# Patient Record
Sex: MALE | Race: WHITE | Employment: OTHER | URBAN - NONMETROPOLITAN AREA
[De-identification: names, ages, dates, MRNs, and addresses within clinical notes are randomized per-mention and may not be internally consistent; named-entity substitution may affect disease eponyms.]

---

## 2017-03-01 DIAGNOSIS — I10 ESSENTIAL HYPERTENSION: ICD-10-CM

## 2017-03-01 RX ORDER — LISINOPRIL 20 MG/1
20 TABLET ORAL 2 TIMES DAILY
Qty: 60 TABLET | Refills: 0 | Status: SHIPPED | OUTPATIENT
Start: 2017-03-01 | End: 2017-03-07 | Stop reason: SDUPTHER

## 2017-03-07 ENCOUNTER — OFFICE VISIT (OUTPATIENT)
Dept: PRIMARY CARE CLINIC | Age: 62
End: 2017-03-07
Payer: COMMERCIAL

## 2017-03-07 VITALS
OXYGEN SATURATION: 95 % | BODY MASS INDEX: 40.8 KG/M2 | HEART RATE: 67 BPM | DIASTOLIC BLOOD PRESSURE: 78 MMHG | TEMPERATURE: 97.3 F | HEIGHT: 69 IN | SYSTOLIC BLOOD PRESSURE: 120 MMHG | WEIGHT: 275.5 LBS

## 2017-03-07 DIAGNOSIS — R35.1 NOCTURIA: ICD-10-CM

## 2017-03-07 DIAGNOSIS — Z13.6 ENCOUNTER FOR LIPID SCREENING FOR CARDIOVASCULAR DISEASE: ICD-10-CM

## 2017-03-07 DIAGNOSIS — Z79.899 MEDICATION MANAGEMENT: ICD-10-CM

## 2017-03-07 DIAGNOSIS — Z13.1 SCREENING FOR DIABETES MELLITUS: ICD-10-CM

## 2017-03-07 DIAGNOSIS — Z12.5 SCREENING FOR MALIGNANT NEOPLASM OF PROSTATE: ICD-10-CM

## 2017-03-07 DIAGNOSIS — Z13.220 ENCOUNTER FOR LIPID SCREENING FOR CARDIOVASCULAR DISEASE: ICD-10-CM

## 2017-03-07 DIAGNOSIS — R25.2 MUSCLE CRAMPS: ICD-10-CM

## 2017-03-07 DIAGNOSIS — L98.9 SKIN LESION: ICD-10-CM

## 2017-03-07 DIAGNOSIS — I10 ESSENTIAL HYPERTENSION: ICD-10-CM

## 2017-03-07 DIAGNOSIS — Z00.00 WELL ADULT HEALTH CHECK: Primary | ICD-10-CM

## 2017-03-07 DIAGNOSIS — E78.2 MIXED HYPERLIPIDEMIA: ICD-10-CM

## 2017-03-07 LAB
ALBUMIN SERPL-MCNC: 4.8 G/DL (ref 3.5–5.2)
ALP BLD-CCNC: 84 U/L (ref 40–130)
ALT SERPL-CCNC: 27 U/L (ref 5–41)
ANION GAP SERPL CALCULATED.3IONS-SCNC: 18 MMOL/L (ref 7–19)
AST SERPL-CCNC: 23 U/L (ref 5–40)
BILIRUB SERPL-MCNC: 0.5 MG/DL (ref 0.2–1.2)
BILIRUBIN, POC: NORMAL
BLOOD URINE, POC: NORMAL
BUN BLDV-MCNC: 14 MG/DL (ref 8–23)
CALCIUM SERPL-MCNC: 9.1 MG/DL (ref 8.8–10.2)
CHLORIDE BLD-SCNC: 99 MMOL/L (ref 98–111)
CHOLESTEROL, TOTAL: 215 MG/DL (ref 160–199)
CLARITY, POC: CLEAR
CO2: 25 MMOL/L (ref 22–29)
COLOR, POC: YELLOW
CREAT SERPL-MCNC: 0.9 MG/DL (ref 0.5–1.2)
GFR NON-AFRICAN AMERICAN: >60
GLOBULIN: 2.5 G/DL
GLUCOSE BLD-MCNC: 119 MG/DL (ref 74–109)
GLUCOSE URINE, POC: NORMAL
HCT VFR BLD CALC: 48.1 % (ref 42–52)
HDLC SERPL-MCNC: 63 MG/DL (ref 55–121)
HEMOGLOBIN: 16.2 G/DL (ref 14–18)
KETONES, POC: NORMAL
LDL CHOLESTEROL CALCULATED: 132 MG/DL
LEUKOCYTE EST, POC: NORMAL
MCH RBC QN AUTO: 30.9 PG (ref 27–31)
MCHC RBC AUTO-ENTMCNC: 33.7 G/DL (ref 33–37)
MCV RBC AUTO: 91.8 FL (ref 80–94)
NITRITE, POC: NORMAL
PDW BLD-RTO: 13.2 % (ref 11.5–14.5)
PH, POC: 5
PLATELET # BLD: 247 K/UL (ref 130–400)
PMV BLD AUTO: 10.8 FL (ref 7.4–10.4)
POTASSIUM SERPL-SCNC: 3.7 MMOL/L (ref 3.5–5)
PROSTATE SPECIFIC ANTIGEN: 2.35 NG/ML (ref 0–4)
PROTEIN, POC: NORMAL
RBC # BLD: 5.24 M/UL (ref 4.7–6.1)
SODIUM BLD-SCNC: 142 MMOL/L (ref 136–145)
SPECIFIC GRAVITY, POC: 1.02
TOTAL CK: 166 U/L (ref 39–308)
TOTAL PROTEIN: 7.3 G/DL (ref 6.6–8.7)
TRIGL SERPL-MCNC: 98 MG/DL (ref 150–199)
UROBILINOGEN, POC: 0.2
WBC # BLD: 5.6 K/UL (ref 4.8–10.8)

## 2017-03-07 PROCEDURE — 99396 PREV VISIT EST AGE 40-64: CPT | Performed by: NURSE PRACTITIONER

## 2017-03-07 PROCEDURE — 81002 URINALYSIS NONAUTO W/O SCOPE: CPT | Performed by: NURSE PRACTITIONER

## 2017-03-07 PROCEDURE — 36415 COLL VENOUS BLD VENIPUNCTURE: CPT | Performed by: NURSE PRACTITIONER

## 2017-03-07 RX ORDER — AMLODIPINE BESYLATE AND BENAZEPRIL HYDROCHLORIDE 5; 20 MG/1; MG/1
1 CAPSULE ORAL DAILY
Qty: 90 CAPSULE | Refills: 3 | Status: SHIPPED | OUTPATIENT
Start: 2017-03-07 | End: 2017-03-15 | Stop reason: SDUPTHER

## 2017-03-07 RX ORDER — AMLODIPINE BESYLATE AND BENAZEPRIL HYDROCHLORIDE 5; 20 MG/1; MG/1
1 CAPSULE ORAL DAILY
COMMUNITY
End: 2017-03-07 | Stop reason: SDUPTHER

## 2017-03-07 RX ORDER — DULOXETIN HYDROCHLORIDE 30 MG/1
30 CAPSULE, DELAYED RELEASE ORAL DAILY
Qty: 30 CAPSULE | Refills: 0 | Status: SHIPPED | OUTPATIENT
Start: 2017-03-07 | End: 2018-03-08 | Stop reason: ALTCHOICE

## 2017-03-07 RX ORDER — LISINOPRIL 20 MG/1
20 TABLET ORAL 2 TIMES DAILY
Qty: 180 TABLET | Refills: 3 | Status: SHIPPED | OUTPATIENT
Start: 2017-03-07 | End: 2017-03-07 | Stop reason: SDUPTHER

## 2017-03-07 RX ORDER — LISINOPRIL 20 MG/1
20 TABLET ORAL 2 TIMES DAILY
Qty: 180 TABLET | Refills: 3 | Status: SHIPPED | OUTPATIENT
Start: 2017-03-07 | End: 2017-03-15 | Stop reason: SDUPTHER

## 2017-03-07 RX ORDER — AMLODIPINE BESYLATE AND BENAZEPRIL HYDROCHLORIDE 5; 20 MG/1; MG/1
1 CAPSULE ORAL DAILY
Qty: 90 CAPSULE | Refills: 3 | Status: SHIPPED | OUTPATIENT
Start: 2017-03-07 | End: 2017-03-07 | Stop reason: SDUPTHER

## 2017-03-09 ENCOUNTER — TELEPHONE (OUTPATIENT)
Dept: PRIMARY CARE CLINIC | Age: 62
End: 2017-03-09

## 2017-03-15 DIAGNOSIS — I10 ESSENTIAL HYPERTENSION: ICD-10-CM

## 2017-03-15 RX ORDER — AMLODIPINE BESYLATE AND BENAZEPRIL HYDROCHLORIDE 5; 20 MG/1; MG/1
1 CAPSULE ORAL DAILY
Qty: 90 CAPSULE | Refills: 3 | Status: SHIPPED | OUTPATIENT
Start: 2017-03-15 | End: 2018-03-08 | Stop reason: SDUPTHER

## 2017-03-15 RX ORDER — LISINOPRIL 20 MG/1
20 TABLET ORAL 2 TIMES DAILY
Qty: 180 TABLET | Refills: 3 | Status: SHIPPED | OUTPATIENT
Start: 2017-03-15 | End: 2018-03-08 | Stop reason: SDUPTHER

## 2017-04-27 RX ORDER — PRAVASTATIN SODIUM 40 MG
TABLET ORAL
Qty: 180 TABLET | Refills: 2 | Status: SHIPPED | OUTPATIENT
Start: 2017-04-27 | End: 2018-03-08 | Stop reason: SDUPTHER

## 2017-11-29 ENCOUNTER — OFFICE VISIT (OUTPATIENT)
Dept: PRIMARY CARE CLINIC | Age: 62
End: 2017-11-29
Payer: COMMERCIAL

## 2017-11-29 VITALS
DIASTOLIC BLOOD PRESSURE: 88 MMHG | SYSTOLIC BLOOD PRESSURE: 138 MMHG | BODY MASS INDEX: 39.25 KG/M2 | HEART RATE: 68 BPM | HEIGHT: 69 IN | OXYGEN SATURATION: 96 % | WEIGHT: 265 LBS | RESPIRATION RATE: 20 BRPM | TEMPERATURE: 97.3 F

## 2017-11-29 DIAGNOSIS — N20.0 KIDNEY STONE: Primary | ICD-10-CM

## 2017-11-29 DIAGNOSIS — J06.9 URI, ACUTE: ICD-10-CM

## 2017-11-29 PROCEDURE — 99214 OFFICE O/P EST MOD 30 MIN: CPT | Performed by: FAMILY MEDICINE

## 2017-11-29 RX ORDER — GUAIFENESIN AND CODEINE PHOSPHATE 100; 10 MG/5ML; MG/5ML
5 SOLUTION ORAL 2 TIMES DAILY PRN
Qty: 140 ML | Refills: 0 | Status: SHIPPED | OUTPATIENT
Start: 2017-11-29 | End: 2017-12-04

## 2017-11-29 RX ORDER — PREDNISONE 10 MG/1
TABLET ORAL
Qty: 21 EACH | Refills: 0 | Status: SHIPPED | OUTPATIENT
Start: 2017-11-29 | End: 2017-12-04

## 2017-11-29 ASSESSMENT — ENCOUNTER SYMPTOMS
ABDOMINAL PAIN: 1
WHEEZING: 1
COLOR CHANGE: 0
RHINORRHEA: 0
COUGH: 1
NAUSEA: 1
DIARRHEA: 0
VOMITING: 1
CONSTIPATION: 0

## 2017-11-29 NOTE — PROGRESS NOTES
Subjective:      Patient ID: Bernie Flynn is a 57 y.o. male. HPI   Patient presents today for problems with cough and congestion. He states that he started feeling bad last Friday and was seen in the ER due to severe left-sided flank pain. He states that he had nausea and vomiting associated with the flank pain. He states that he was seen in the ER they diagnosed him with a 2 mm ureteral stone. He states that they put him on medications to help with the pain but they really did not help much. He states that he felt the stone removed a couple days ago and the pain has started improving. Of coincidence, he has started having cough, congestion, headache, and wheezing. He states that he has not been taking anything other than over-the-counter Mucinex for the symptoms. He states that he wanted to make sure that these were not related. He states that he is having difficulty breathing and having some wheezing when he lays down at night. Past Medical History:   Diagnosis Date    Chronic back pain     Hyperlipidemia     Hypertension     Obesity      Current Outpatient Prescriptions on File Prior to Visit   Medication Sig Dispense Refill    pravastatin (PRAVACHOL) 40 MG tablet TAKE 2 TABLETS NIGHTLY 180 tablet 2    lisinopril (PRINIVIL;ZESTRIL) 20 MG tablet Take 1 tablet by mouth 2 times daily For blood pressure 180 tablet 3    amLODIPine-benazepril (LOTREL) 5-20 MG per capsule Take 1 capsule by mouth daily 90 capsule 3    cloNIDine (CATAPRES) 0.1 MG tablet Take 1 tablet by mouth daily as needed 90 tablet 3    DULoxetine (CYMBALTA) 30 MG extended release capsule Take 1 capsule by mouth daily May go down to 30mg and come off if desired. 30 capsule 0     No current facility-administered medications on file prior to visit. No Known Allergies    Review of Systems   Constitutional: Positive for chills, fatigue and fever. Negative for activity change and appetite change.    HENT: Positive for congestion. Negative for rhinorrhea. Eyes: Negative for visual disturbance. Respiratory: Positive for cough and wheezing. Cardiovascular: Negative for chest pain and palpitations. Gastrointestinal: Positive for abdominal pain (after throwing up from kidney stone), nausea and vomiting. Negative for constipation and diarrhea. Genitourinary: Negative for decreased urine volume and difficulty urinating. Musculoskeletal: Negative for arthralgias. Skin: Negative for color change and rash. Allergic/Immunologic: Negative for immunocompromised state. Neurological: Positive for headaches. Negative for seizures. Hematological: Does not bruise/bleed easily. Psychiatric/Behavioral: Negative for agitation and sleep disturbance. Objective:   Physical Exam   Constitutional: He is oriented to person, place, and time. He appears well-developed and well-nourished. No distress. HENT:   Head: Normocephalic and atraumatic. Right Ear: Tympanic membrane normal.   Left Ear: Tympanic membrane normal.   Nose: Mucosal edema and rhinorrhea present. Right sinus exhibits no maxillary sinus tenderness and no frontal sinus tenderness. Left sinus exhibits no maxillary sinus tenderness and no frontal sinus tenderness. Mouth/Throat: Uvula is midline, oropharynx is clear and moist and mucous membranes are normal.   Neck: Normal range of motion. Neck supple. Cardiovascular: Normal rate, regular rhythm, normal heart sounds and intact distal pulses. Pulmonary/Chest: Effort normal and breath sounds normal. No respiratory distress. He has no wheezes. Abdominal: Soft. Bowel sounds are normal. There is no tenderness. Lymphadenopathy:     He has no cervical adenopathy. Neurological: He is alert and oriented to person, place, and time. Skin: Skin is warm and dry. No rash noted. He is not diaphoretic. Psychiatric: He has a normal mood and affect.  His behavior is normal. Judgment and thought content normal.

## 2017-12-04 ENCOUNTER — HOSPITAL ENCOUNTER (OUTPATIENT)
Dept: GENERAL RADIOLOGY | Age: 62
Discharge: HOME | End: 2017-12-04
Payer: COMMERCIAL

## 2017-12-04 ENCOUNTER — OFFICE VISIT (OUTPATIENT)
Dept: PRIMARY CARE CLINIC | Age: 62
End: 2017-12-04
Payer: COMMERCIAL

## 2017-12-04 VITALS
HEART RATE: 62 BPM | RESPIRATION RATE: 16 BRPM | BODY MASS INDEX: 40.88 KG/M2 | OXYGEN SATURATION: 98 % | WEIGHT: 276.8 LBS | SYSTOLIC BLOOD PRESSURE: 132 MMHG | DIASTOLIC BLOOD PRESSURE: 86 MMHG | TEMPERATURE: 98.6 F

## 2017-12-04 DIAGNOSIS — R05.9 COUGH: ICD-10-CM

## 2017-12-04 DIAGNOSIS — R05.9 COUGH: Primary | ICD-10-CM

## 2017-12-04 DIAGNOSIS — J40 BRONCHITIS: ICD-10-CM

## 2017-12-04 PROCEDURE — 99213 OFFICE O/P EST LOW 20 MIN: CPT | Performed by: FAMILY MEDICINE

## 2017-12-04 PROCEDURE — 71020 XR CHEST STANDARD TWO VW: CPT

## 2017-12-04 RX ORDER — AZITHROMYCIN 250 MG/1
TABLET, FILM COATED ORAL
Qty: 1 PACKET | Refills: 0 | Status: SHIPPED | OUTPATIENT
Start: 2017-12-04 | End: 2017-12-14

## 2017-12-04 RX ORDER — GUAIFENESIN 600 MG/1
600 TABLET, EXTENDED RELEASE ORAL 2 TIMES DAILY
Qty: 20 TABLET | Refills: 0 | Status: SHIPPED | OUTPATIENT
Start: 2017-12-04 | End: 2018-09-10

## 2017-12-04 RX ORDER — ALBUTEROL SULFATE 90 UG/1
2 AEROSOL, METERED RESPIRATORY (INHALATION) EVERY 6 HOURS PRN
Qty: 1 INHALER | Refills: 3 | Status: SHIPPED | OUTPATIENT
Start: 2017-12-04 | End: 2018-09-10

## 2017-12-04 RX ORDER — PREDNISONE 10 MG/1
TABLET ORAL
Qty: 1 EACH | Refills: 0 | Status: SHIPPED | OUTPATIENT
Start: 2017-12-04 | End: 2018-03-08 | Stop reason: ALTCHOICE

## 2017-12-06 ASSESSMENT — ENCOUNTER SYMPTOMS
SINUS PRESSURE: 1
CONSTIPATION: 0
COLOR CHANGE: 0
ABDOMINAL PAIN: 0
NAUSEA: 0
VOMITING: 0
COUGH: 1
RHINORRHEA: 1
DIARRHEA: 0

## 2017-12-07 NOTE — PROGRESS NOTES
Allergic/Immunologic: Negative for immunocompromised state. Neurological: Negative for seizures and headaches. Hematological: Does not bruise/bleed easily. Psychiatric/Behavioral: Negative for agitation and sleep disturbance. The patient is not nervous/anxious. Objective:   Physical Exam   Constitutional: He is oriented to person, place, and time. He appears well-developed and well-nourished. No distress. HENT:   Head: Normocephalic and atraumatic. Right Ear: Tympanic membrane normal.   Left Ear: Tympanic membrane normal.   Nose: Mucosal edema and rhinorrhea present. Right sinus exhibits no maxillary sinus tenderness and no frontal sinus tenderness. Left sinus exhibits no maxillary sinus tenderness and no frontal sinus tenderness. Mouth/Throat: Uvula is midline, oropharynx is clear and moist and mucous membranes are normal.   Neck: Normal range of motion. Neck supple. Cardiovascular: Normal rate, regular rhythm, normal heart sounds and intact distal pulses. Pulmonary/Chest: Effort normal. No respiratory distress. He has wheezes. Lymphadenopathy:     He has no cervical adenopathy. Neurological: He is alert and oriented to person, place, and time. Skin: Skin is warm and dry. No rash noted. He is not diaphoretic. Psychiatric: He has a normal mood and affect. His behavior is normal. Judgment and thought content normal.     /86 (Site: Right Arm, Position: Sitting, Cuff Size: Medium Adult)   Pulse 62   Temp 98.6 °F (37 °C) (Temporal)   Resp 16   Wt 276 lb 12.8 oz (125.6 kg)   SpO2 98%   BMI 40.88 kg/m²     Assessment:        ICD-10-CM ICD-9-CM    1. Cough R05 786.2 XR CHEST STANDARD (2 VW)   2. Bronchitis J40 490             Plan:      See prescription orders. Apgar ahead and send him some Zithromax and prednisone today. I have also given him an inhaler for the chest tightness and wheezing. We will plan to get chest x-ray notify results.

## 2018-03-08 ENCOUNTER — OFFICE VISIT (OUTPATIENT)
Dept: PRIMARY CARE CLINIC | Age: 63
End: 2018-03-08
Payer: MEDICARE

## 2018-03-08 VITALS
SYSTOLIC BLOOD PRESSURE: 140 MMHG | BODY MASS INDEX: 42.06 KG/M2 | HEIGHT: 69 IN | OXYGEN SATURATION: 97 % | HEART RATE: 75 BPM | DIASTOLIC BLOOD PRESSURE: 90 MMHG | TEMPERATURE: 98.3 F | WEIGHT: 284 LBS

## 2018-03-08 DIAGNOSIS — E78.2 MIXED HYPERLIPIDEMIA: ICD-10-CM

## 2018-03-08 DIAGNOSIS — Z79.899 MEDICATION MANAGEMENT: ICD-10-CM

## 2018-03-08 DIAGNOSIS — I10 ESSENTIAL HYPERTENSION: ICD-10-CM

## 2018-03-08 DIAGNOSIS — Z23 NEED FOR PNEUMOCOCCAL VACCINATION: ICD-10-CM

## 2018-03-08 DIAGNOSIS — Z13.1 SCREENING FOR DIABETES MELLITUS: ICD-10-CM

## 2018-03-08 DIAGNOSIS — Z00.00 ROUTINE GENERAL MEDICAL EXAMINATION AT A HEALTH CARE FACILITY: Primary | ICD-10-CM

## 2018-03-08 DIAGNOSIS — R73.9 ELEVATED BLOOD SUGAR: ICD-10-CM

## 2018-03-08 DIAGNOSIS — Z13.220 ENCOUNTER FOR LIPID SCREENING FOR CARDIOVASCULAR DISEASE: ICD-10-CM

## 2018-03-08 DIAGNOSIS — Z12.5 SCREENING FOR MALIGNANT NEOPLASM OF PROSTATE: ICD-10-CM

## 2018-03-08 DIAGNOSIS — Z13.6 ENCOUNTER FOR LIPID SCREENING FOR CARDIOVASCULAR DISEASE: ICD-10-CM

## 2018-03-08 LAB
ALBUMIN SERPL-MCNC: 4.2 G/DL (ref 3.5–5.2)
ALP BLD-CCNC: 68 U/L (ref 40–130)
ALT SERPL-CCNC: 21 U/L (ref 5–41)
ANION GAP SERPL CALCULATED.3IONS-SCNC: 16 MMOL/L (ref 7–19)
AST SERPL-CCNC: 17 U/L (ref 5–40)
BILIRUB SERPL-MCNC: 0.7 MG/DL (ref 0.2–1.2)
BUN BLDV-MCNC: 14 MG/DL (ref 8–23)
CALCIUM SERPL-MCNC: 8.9 MG/DL (ref 8.8–10.2)
CHLORIDE BLD-SCNC: 101 MMOL/L (ref 98–111)
CHOLESTEROL, TOTAL: 177 MG/DL (ref 160–199)
CO2: 24 MMOL/L (ref 22–29)
CREAT SERPL-MCNC: 0.9 MG/DL (ref 0.5–1.2)
GFR NON-AFRICAN AMERICAN: >60
GLUCOSE BLD-MCNC: 132 MG/DL (ref 74–109)
HBA1C MFR BLD: 6.4 %
HCT VFR BLD CALC: 43.6 % (ref 42–52)
HDLC SERPL-MCNC: 51 MG/DL (ref 55–121)
HEMOGLOBIN: 15.2 G/DL (ref 14–18)
LDL CHOLESTEROL CALCULATED: 107 MG/DL
MCH RBC QN AUTO: 30.7 PG (ref 27–31)
MCHC RBC AUTO-ENTMCNC: 34.9 G/DL (ref 33–37)
MCV RBC AUTO: 88.1 FL (ref 80–94)
PDW BLD-RTO: 13.2 % (ref 11.5–14.5)
PLATELET # BLD: 209 K/UL (ref 130–400)
PMV BLD AUTO: 11.3 FL (ref 9.4–12.4)
POTASSIUM SERPL-SCNC: 3.8 MMOL/L (ref 3.5–5)
PROSTATE SPECIFIC ANTIGEN: 1.87 NG/ML (ref 0–4)
RBC # BLD: 4.95 M/UL (ref 4.7–6.1)
SODIUM BLD-SCNC: 141 MMOL/L (ref 136–145)
TOTAL PROTEIN: 6.7 G/DL (ref 6.6–8.7)
TRIGL SERPL-MCNC: 95 MG/DL (ref 0–149)
WBC # BLD: 5.5 K/UL (ref 4.8–10.8)

## 2018-03-08 PROCEDURE — G0009 ADMIN PNEUMOCOCCAL VACCINE: HCPCS | Performed by: NURSE PRACTITIONER

## 2018-03-08 PROCEDURE — G0402 INITIAL PREVENTIVE EXAM: HCPCS | Performed by: NURSE PRACTITIONER

## 2018-03-08 PROCEDURE — 90670 PCV13 VACCINE IM: CPT | Performed by: NURSE PRACTITIONER

## 2018-03-08 PROCEDURE — 36415 COLL VENOUS BLD VENIPUNCTURE: CPT | Performed by: NURSE PRACTITIONER

## 2018-03-08 RX ORDER — AMLODIPINE BESYLATE AND BENAZEPRIL HYDROCHLORIDE 5; 20 MG/1; MG/1
1 CAPSULE ORAL DAILY
Qty: 90 CAPSULE | Refills: 3 | Status: SHIPPED | OUTPATIENT
Start: 2018-03-08 | End: 2018-03-28 | Stop reason: SDUPTHER

## 2018-03-08 RX ORDER — PRAVASTATIN SODIUM 40 MG
TABLET ORAL
Qty: 180 TABLET | Refills: 2 | Status: SHIPPED | OUTPATIENT
Start: 2018-03-08 | End: 2018-03-28 | Stop reason: SDUPTHER

## 2018-03-08 RX ORDER — LISINOPRIL 20 MG/1
20 TABLET ORAL 2 TIMES DAILY
Qty: 180 TABLET | Refills: 3 | Status: SHIPPED | OUTPATIENT
Start: 2018-03-08 | End: 2018-03-28 | Stop reason: SDUPTHER

## 2018-03-08 ASSESSMENT — ANXIETY QUESTIONNAIRES: GAD7 TOTAL SCORE: 3

## 2018-03-08 ASSESSMENT — PATIENT HEALTH QUESTIONNAIRE - PHQ9
SUM OF ALL RESPONSES TO PHQ9 QUESTIONS 1 & 2: 0
2. FEELING DOWN, DEPRESSED OR HOPELESS: 0
SUM OF ALL RESPONSES TO PHQ QUESTIONS 1-9: 0
1. LITTLE INTEREST OR PLEASURE IN DOING THINGS: 0

## 2018-03-08 ASSESSMENT — LIFESTYLE VARIABLES: HOW OFTEN DO YOU HAVE A DRINK CONTAINING ALCOHOL: 0

## 2018-03-28 DIAGNOSIS — I10 ESSENTIAL HYPERTENSION: ICD-10-CM

## 2018-03-28 RX ORDER — PRAVASTATIN SODIUM 40 MG
TABLET ORAL
Qty: 180 TABLET | Refills: 2 | Status: SHIPPED | OUTPATIENT
Start: 2018-03-28 | End: 2019-05-20

## 2018-03-28 RX ORDER — AMLODIPINE BESYLATE AND BENAZEPRIL HYDROCHLORIDE 5; 20 MG/1; MG/1
1 CAPSULE ORAL DAILY
Qty: 90 CAPSULE | Refills: 3 | Status: SHIPPED | OUTPATIENT
Start: 2018-03-28 | End: 2019-03-15

## 2018-03-28 RX ORDER — LISINOPRIL 20 MG/1
20 TABLET ORAL 2 TIMES DAILY
Qty: 180 TABLET | Refills: 3 | Status: SHIPPED | OUTPATIENT
Start: 2018-03-28 | End: 2019-03-12 | Stop reason: ALTCHOICE

## 2018-09-10 ENCOUNTER — OFFICE VISIT (OUTPATIENT)
Dept: PRIMARY CARE CLINIC | Age: 63
End: 2018-09-10
Payer: MEDICARE

## 2018-09-10 VITALS
SYSTOLIC BLOOD PRESSURE: 142 MMHG | TEMPERATURE: 97.1 F | BODY MASS INDEX: 42.86 KG/M2 | DIASTOLIC BLOOD PRESSURE: 88 MMHG | WEIGHT: 289.4 LBS | RESPIRATION RATE: 16 BRPM | OXYGEN SATURATION: 96 % | HEIGHT: 69 IN | HEART RATE: 71 BPM

## 2018-09-10 DIAGNOSIS — M54.41 ACUTE RIGHT-SIDED LOW BACK PAIN WITH RIGHT-SIDED SCIATICA: ICD-10-CM

## 2018-09-10 DIAGNOSIS — E66.01 MORBID OBESITY WITH BMI OF 40.0-44.9, ADULT (HCC): ICD-10-CM

## 2018-09-10 DIAGNOSIS — I10 ESSENTIAL HYPERTENSION: Primary | ICD-10-CM

## 2018-09-10 DIAGNOSIS — R73.9 ELEVATED BLOOD SUGAR: ICD-10-CM

## 2018-09-10 DIAGNOSIS — E78.2 MIXED HYPERLIPIDEMIA: ICD-10-CM

## 2018-09-10 LAB
ALBUMIN SERPL-MCNC: 4.5 G/DL (ref 3.5–5.2)
ALP BLD-CCNC: 82 U/L (ref 40–130)
ALT SERPL-CCNC: 28 U/L (ref 5–41)
ANION GAP SERPL CALCULATED.3IONS-SCNC: 18 MMOL/L (ref 7–19)
AST SERPL-CCNC: 24 U/L (ref 5–40)
BILIRUB SERPL-MCNC: 0.6 MG/DL (ref 0.2–1.2)
BUN BLDV-MCNC: 14 MG/DL (ref 8–23)
CALCIUM SERPL-MCNC: 9.3 MG/DL (ref 8.8–10.2)
CHLORIDE BLD-SCNC: 100 MMOL/L (ref 98–111)
CHOLESTEROL, TOTAL: 199 MG/DL (ref 160–199)
CO2: 23 MMOL/L (ref 22–29)
CREAT SERPL-MCNC: 0.9 MG/DL (ref 0.5–1.2)
GFR NON-AFRICAN AMERICAN: >60
GLUCOSE BLD-MCNC: 148 MG/DL (ref 74–109)
HBA1C MFR BLD: 6.7 % (ref 4–6)
HDLC SERPL-MCNC: 51 MG/DL (ref 55–121)
LDL CHOLESTEROL CALCULATED: 128 MG/DL
POTASSIUM SERPL-SCNC: 3.5 MMOL/L (ref 3.5–5)
SODIUM BLD-SCNC: 141 MMOL/L (ref 136–145)
TOTAL PROTEIN: 7.1 G/DL (ref 6.6–8.7)
TRIGL SERPL-MCNC: 99 MG/DL (ref 0–149)

## 2018-09-10 PROCEDURE — 1123F ACP DISCUSS/DSCN MKR DOCD: CPT | Performed by: NURSE PRACTITIONER

## 2018-09-10 PROCEDURE — G8427 DOCREV CUR MEDS BY ELIG CLIN: HCPCS | Performed by: NURSE PRACTITIONER

## 2018-09-10 PROCEDURE — 36415 COLL VENOUS BLD VENIPUNCTURE: CPT | Performed by: NURSE PRACTITIONER

## 2018-09-10 PROCEDURE — 1036F TOBACCO NON-USER: CPT | Performed by: NURSE PRACTITIONER

## 2018-09-10 PROCEDURE — 3017F COLORECTAL CA SCREEN DOC REV: CPT | Performed by: NURSE PRACTITIONER

## 2018-09-10 PROCEDURE — 1101F PT FALLS ASSESS-DOCD LE1/YR: CPT | Performed by: NURSE PRACTITIONER

## 2018-09-10 PROCEDURE — 99214 OFFICE O/P EST MOD 30 MIN: CPT | Performed by: NURSE PRACTITIONER

## 2018-09-10 PROCEDURE — G8417 CALC BMI ABV UP PARAM F/U: HCPCS | Performed by: NURSE PRACTITIONER

## 2018-09-10 PROCEDURE — 4040F PNEUMOC VAC/ADMIN/RCVD: CPT | Performed by: NURSE PRACTITIONER

## 2018-09-10 RX ORDER — OXYCODONE HYDROCHLORIDE AND ACETAMINOPHEN 5; 325 MG/1; MG/1
1 TABLET ORAL EVERY 6 HOURS PRN
Qty: 12 TABLET | Refills: 0 | Status: SHIPPED | OUTPATIENT
Start: 2018-09-10 | End: 2018-09-13

## 2018-09-10 ASSESSMENT — ENCOUNTER SYMPTOMS
BACK PAIN: 1
GASTROINTESTINAL NEGATIVE: 1

## 2018-09-10 NOTE — PROGRESS NOTES
[facility] PHYSICIAN SERVICES  [facility] [place]  [address]  [address]  Dept: [phone]  Dept Fax: [phone]  Loc: [phone]    Sandra Recio is a 70 y.o. male who presents today for his medical conditions/complaints as noted below. Sandra Recio is c/o of Back Pain (hurt it about 3 weeks ago, did have trouble sitting, has gotten better now its more of discomfort, no radiation down his legs); Hypertension; and 6 Month Follow-Up        HPI:     HPI   Chief Complaint   Patient presents with    Back Pain     hurt it about 3 weeks ago, did have trouble sitting, has gotten better now its more of discomfort, no radiation down his legs    Hypertension    6 Month Follow-Up   He has had several eye surgeries this year and has gained weight. His bp has been varying. He denies any specific injury to is low back. He has had flare ups of low back pain in the past.    Past Medical History:   Diagnosis Date    Chronic back pain     Hyperlipidemia     Hypertension     Obesity       Past Surgical History:   Procedure Laterality Date    ACHILLES TENDON SURGERY      COLONOSCOPY  12/16/15    [facility], normal    EYE SURGERY Bilateral     lens replaced    PRE-MALIGNANT / BENIGN SKIN LESION EXCISION  2017    RETINAL DETACHMENT SURGERY Left 10/14    RETINAL DETACHMENT SURGERY Left 07/2018    VASCULAR SURGERY  11/4/15 [facility] [address]    Aortorenal arteriogram with oblique views of the renal arteries.        Vitals 9/10/2018 9/10/2018 3/8/2018 3/8/2018 12/4/2017 86/43/0039   SYSTOLIC [phone] [id number]   DIASTOLIC 88 96 90 94 86 88   Site - Left Upper Arm - Right Arm Right Arm Right Arm   Position - Sitting - Sitting Sitting Sitting   Cuff Size - Medium Adult - Medium Adult Medium Adult Large Adult   Pulse - 71 - 75 62 68   Temp - 97.1 - 98.3 98.6 97.3   Resp - 16 - - 16 20   Weight - 289 lb 6.4 oz - 284 lb 276 lb 12.8 oz 265 lb   Height - 5' 9\" - 5' 9\" - 5' 9\"   BMI (wt*703/ht~2) - 42.73 kg/m2 - 41.93 kg/m2 - 39.13 kg/m2   Some recent data might be hidden       Family History   Problem Relation Age of Onset    Heart Disease Mother     High Blood Pressure Mother     Heart Disease Father     High Blood Pressure Father        Social History   Substance Use Topics    Smoking status: Never Smoker    Smokeless tobacco: Never Used    Alcohol use No      Current Outpatient Prescriptions   Medication Sig Dispense Refill    oxyCODONE-acetaminophen (PERCOCET) 5-325 MG per tablet Take 1 tablet by mouth every 6 hours as needed for Pain for up to 3 days. Intended supply: 3 days. Take lowest dose possible to manage pain. 12 tablet 0    amLODIPine-benazepril (LOTREL) 5-20 MG per capsule Take 1 capsule by mouth daily 90 capsule 3    lisinopril (PRINIVIL;ZESTRIL) 20 MG tablet Take 1 tablet by mouth 2 times daily For blood pressure 180 tablet 3    pravastatin (PRAVACHOL) 40 MG tablet TAKE 2 TABLETS NIGHTLY 180 tablet 2    cloNIDine (CATAPRES) 0.1 MG tablet Take 1 tablet by mouth daily as needed 90 tablet 3     No current facility-administered medications for this visit. No Known Allergies    Health Maintenance   Topic Date Due    Shingles Vaccine (1 of 2 - 2 Dose Series) 01/25/2003    Flu vaccine (1) 09/01/2018    DTaP/Tdap/Td vaccine (1 - Tdap) 04/16/2019 (Originally 6/28/2011)    Hepatitis C screen  04/24/2021 (Originally 1955)    HIV screen  04/17/2020 (Originally 1/25/1970)    A1C test (Diabetic or Prediabetic)  03/08/2019    Pneumococcal low/med risk (2 of 2 - PPSV23) 03/08/2019    Potassium monitoring  03/08/2019    Creatinine monitoring  03/08/2019    Colon cancer screen colonoscopy  12/16/2020    Lipid screen  03/08/2023       Subjective:      Review of Systems   Constitutional: Negative. HENT: Negative. Eyes:        Recent eye surgery   Gastrointestinal: Negative. Musculoskeletal: Positive for back pain. Psychiatric/Behavioral: Negative. Objective:     Physical Exam   Constitutional: He is oriented to person, place, and time. He appears well-developed and well-nourished. HENT:   Head: Normocephalic. Cardiovascular: Normal rate, regular rhythm and normal heart sounds. Pulmonary/Chest: Effort normal and breath sounds normal.   Abdominal: Soft. Bowel sounds are normal.   Musculoskeletal:        Lumbar back: He exhibits decreased range of motion, tenderness and pain. He exhibits no bony tenderness, no swelling, no edema, no deformity, no laceration, no spasm and normal pulse. Neurological: He is alert and oriented to person, place, and time. Skin: Skin is warm and dry. Psychiatric: He has a normal mood and affect. His behavior is normal. Judgment and thought content normal.   Nursing note and vitals reviewed. BP (!) 142/88   Pulse 71   Temp 97.1 °F (36.2 °C) (Temporal)   Resp 16   Ht 5' 9\" (1.753 m)   Wt 289 lb 6.4 oz (131.3 kg)   SpO2 96%   BMI 42.74 kg/m²     Assessment:       Diagnosis Orders   1. Essential hypertension  Comprehensive Metabolic Panel   2. Mixed hyperlipidemia  Lipid Panel   3. Class 3 obesity without serious comorbidity with body mass index (BMI) of 40.0 to 44.9 in adult, unspecified obesity type (HCC)     4. Elevated blood sugar  Hemoglobin A1c   5. Acute right-sided low back pain with right-sided sciatica  oxyCODONE-acetaminophen (PERCOCET) 5-325 MG per tablet       Plan:   He had a kidney stone in the spring but this back pain is not that bad. He would like to have a few percocet to have on hand for pain in his back or kidney stone pain. More than 50% of the time was spent counseling and coordinating care for a total time of 60min face to face. Patient given educational materials - see patient instructions. Discussed use, benefit, and side effects of prescribed medications. All patient questions answered. Pt voiced understanding. Reviewed health maintenance.   Instructed to continue current

## 2019-03-12 ENCOUNTER — OFFICE VISIT (OUTPATIENT)
Dept: PRIMARY CARE CLINIC | Age: 64
End: 2019-03-12
Payer: MEDICARE

## 2019-03-12 VITALS
OXYGEN SATURATION: 96 % | DIASTOLIC BLOOD PRESSURE: 90 MMHG | SYSTOLIC BLOOD PRESSURE: 140 MMHG | TEMPERATURE: 97.4 F | HEIGHT: 69 IN | BODY MASS INDEX: 42.36 KG/M2 | HEART RATE: 68 BPM | WEIGHT: 286 LBS

## 2019-03-12 DIAGNOSIS — E66.01 MORBID OBESITY WITH BMI OF 40.0-44.9, ADULT (HCC): ICD-10-CM

## 2019-03-12 DIAGNOSIS — Z23 NEED FOR PNEUMOCOCCAL VACCINATION: ICD-10-CM

## 2019-03-12 DIAGNOSIS — Z13.1 SCREENING FOR DIABETES MELLITUS: ICD-10-CM

## 2019-03-12 DIAGNOSIS — Z79.899 MEDICATION MANAGEMENT: ICD-10-CM

## 2019-03-12 DIAGNOSIS — E11.9 TYPE 2 DIABETES MELLITUS WITHOUT COMPLICATION, WITHOUT LONG-TERM CURRENT USE OF INSULIN (HCC): ICD-10-CM

## 2019-03-12 DIAGNOSIS — Z00.00 ROUTINE GENERAL MEDICAL EXAMINATION AT A HEALTH CARE FACILITY: Primary | ICD-10-CM

## 2019-03-12 DIAGNOSIS — Z13.220 ENCOUNTER FOR SCREENING FOR LIPID DISORDER: ICD-10-CM

## 2019-03-12 DIAGNOSIS — Z12.5 SCREENING FOR MALIGNANT NEOPLASM OF PROSTATE: ICD-10-CM

## 2019-03-12 DIAGNOSIS — R73.9 ELEVATED BLOOD SUGAR: ICD-10-CM

## 2019-03-12 DIAGNOSIS — I10 ESSENTIAL HYPERTENSION: ICD-10-CM

## 2019-03-12 LAB
ALBUMIN SERPL-MCNC: 4.4 G/DL (ref 3.5–5.2)
ALP BLD-CCNC: 83 U/L (ref 40–130)
ALT SERPL-CCNC: 25 U/L (ref 5–41)
ANION GAP SERPL CALCULATED.3IONS-SCNC: 12 MMOL/L (ref 7–19)
AST SERPL-CCNC: 22 U/L (ref 5–40)
BILIRUB SERPL-MCNC: 0.7 MG/DL (ref 0.2–1.2)
BUN BLDV-MCNC: 13 MG/DL (ref 8–23)
CALCIUM SERPL-MCNC: 9 MG/DL (ref 8.8–10.2)
CHLORIDE BLD-SCNC: 104 MMOL/L (ref 98–111)
CHOLESTEROL, TOTAL: 217 MG/DL (ref 160–199)
CO2: 27 MMOL/L (ref 22–29)
CREAT SERPL-MCNC: 1 MG/DL (ref 0.5–1.2)
CREATININE URINE: 179.1 MG/DL (ref 4.2–622)
GFR NON-AFRICAN AMERICAN: >60
GLUCOSE BLD-MCNC: 139 MG/DL (ref 74–109)
HBA1C MFR BLD: 6.4 % (ref 4–6)
HCT VFR BLD CALC: 46.8 % (ref 42–52)
HDLC SERPL-MCNC: 51 MG/DL (ref 55–121)
HEMOGLOBIN: 15.9 G/DL (ref 14–18)
LDL CHOLESTEROL CALCULATED: 145 MG/DL
MCH RBC QN AUTO: 30.5 PG (ref 27–31)
MCHC RBC AUTO-ENTMCNC: 34 G/DL (ref 33–37)
MCV RBC AUTO: 89.7 FL (ref 80–94)
MICROALBUMIN UR-MCNC: 2.3 MG/DL (ref 0–19)
MICROALBUMIN/CREAT UR-RTO: 12.8 MG/G
PDW BLD-RTO: 12.6 % (ref 11.5–14.5)
PLATELET # BLD: 211 K/UL (ref 130–400)
PMV BLD AUTO: 10.8 FL (ref 9.4–12.4)
POTASSIUM SERPL-SCNC: 3.8 MMOL/L (ref 3.5–5)
PROSTATE SPECIFIC ANTIGEN: 2.31 NG/ML (ref 0–4)
RBC # BLD: 5.22 M/UL (ref 4.7–6.1)
SODIUM BLD-SCNC: 143 MMOL/L (ref 136–145)
TOTAL PROTEIN: 7.3 G/DL (ref 6.6–8.7)
TRIGL SERPL-MCNC: 105 MG/DL (ref 0–149)
WBC # BLD: 5.1 K/UL (ref 4.8–10.8)

## 2019-03-12 PROCEDURE — G8484 FLU IMMUNIZE NO ADMIN: HCPCS | Performed by: NURSE PRACTITIONER

## 2019-03-12 PROCEDURE — 90732 PPSV23 VACC 2 YRS+ SUBQ/IM: CPT | Performed by: NURSE PRACTITIONER

## 2019-03-12 PROCEDURE — G0438 PPPS, INITIAL VISIT: HCPCS | Performed by: NURSE PRACTITIONER

## 2019-03-12 PROCEDURE — 3046F HEMOGLOBIN A1C LEVEL >9.0%: CPT | Performed by: NURSE PRACTITIONER

## 2019-03-12 PROCEDURE — 4040F PNEUMOC VAC/ADMIN/RCVD: CPT | Performed by: NURSE PRACTITIONER

## 2019-03-12 PROCEDURE — 36415 COLL VENOUS BLD VENIPUNCTURE: CPT | Performed by: NURSE PRACTITIONER

## 2019-03-12 PROCEDURE — G0009 ADMIN PNEUMOCOCCAL VACCINE: HCPCS | Performed by: NURSE PRACTITIONER

## 2019-03-12 RX ORDER — PROPRANOLOL HYDROCHLORIDE 80 MG/1
80 CAPSULE, EXTENDED RELEASE ORAL DAILY
Qty: 30 CAPSULE | Refills: 3 | Status: SHIPPED | OUTPATIENT
Start: 2019-03-12 | End: 2019-03-15

## 2019-03-12 ASSESSMENT — ANXIETY QUESTIONNAIRES: GAD7 TOTAL SCORE: 0

## 2019-03-12 ASSESSMENT — PATIENT HEALTH QUESTIONNAIRE - PHQ9
SUM OF ALL RESPONSES TO PHQ QUESTIONS 1-9: 0
SUM OF ALL RESPONSES TO PHQ QUESTIONS 1-9: 0

## 2019-03-12 ASSESSMENT — LIFESTYLE VARIABLES: HOW OFTEN DO YOU HAVE A DRINK CONTAINING ALCOHOL: 0

## 2019-05-19 DIAGNOSIS — I10 ESSENTIAL HYPERTENSION: ICD-10-CM

## 2019-05-20 RX ORDER — PRAVASTATIN SODIUM 40 MG
TABLET ORAL
Qty: 180 TABLET | Refills: 0 | Status: SHIPPED | OUTPATIENT
Start: 2019-05-20 | End: 2019-08-24 | Stop reason: SDUPTHER

## 2019-05-20 RX ORDER — AMLODIPINE BESYLATE AND BENAZEPRIL HYDROCHLORIDE 5; 20 MG/1; MG/1
1 CAPSULE ORAL DAILY
Qty: 90 CAPSULE | Refills: 0 | Status: SHIPPED | OUTPATIENT
Start: 2019-05-20 | End: 2020-01-09

## 2019-05-20 RX ORDER — LISINOPRIL 20 MG/1
20 TABLET ORAL 2 TIMES DAILY
Qty: 180 TABLET | Refills: 0 | Status: SHIPPED | OUTPATIENT
Start: 2019-05-20 | End: 2019-08-24 | Stop reason: SDUPTHER

## 2019-06-10 RX ORDER — METOPROLOL SUCCINATE 50 MG/1
TABLET, EXTENDED RELEASE ORAL
Qty: 30 TABLET | Refills: 2 | Status: SHIPPED | OUTPATIENT
Start: 2019-06-10 | End: 2019-11-27 | Stop reason: SDUPTHER

## 2019-08-24 DIAGNOSIS — I10 ESSENTIAL HYPERTENSION: ICD-10-CM

## 2019-08-26 RX ORDER — LISINOPRIL 20 MG/1
20 TABLET ORAL 2 TIMES DAILY
Qty: 180 TABLET | Refills: 0 | Status: SHIPPED | OUTPATIENT
Start: 2019-08-26 | End: 2019-11-27 | Stop reason: SDUPTHER

## 2019-08-26 RX ORDER — PRAVASTATIN SODIUM 40 MG
TABLET ORAL
Qty: 180 TABLET | Refills: 0 | Status: SHIPPED | OUTPATIENT
Start: 2019-08-26 | End: 2019-11-30 | Stop reason: SDUPTHER

## 2019-12-02 RX ORDER — PRAVASTATIN SODIUM 40 MG
TABLET ORAL
Qty: 180 TABLET | Refills: 0 | Status: SHIPPED | OUTPATIENT
Start: 2019-12-02 | End: 2020-02-25

## 2020-01-09 ENCOUNTER — OFFICE VISIT (OUTPATIENT)
Dept: PRIMARY CARE CLINIC | Age: 65
End: 2020-01-09
Payer: MEDICARE

## 2020-01-09 VITALS
RESPIRATION RATE: 18 BRPM | SYSTOLIC BLOOD PRESSURE: 130 MMHG | TEMPERATURE: 97.8 F | HEART RATE: 50 BPM | OXYGEN SATURATION: 97 % | HEIGHT: 69 IN | BODY MASS INDEX: 40.29 KG/M2 | DIASTOLIC BLOOD PRESSURE: 90 MMHG | WEIGHT: 272 LBS

## 2020-01-09 LAB
ALBUMIN SERPL-MCNC: 4.5 G/DL (ref 3.5–5.2)
ALP BLD-CCNC: 71 U/L (ref 40–130)
ALT SERPL-CCNC: 20 U/L (ref 5–41)
ANION GAP SERPL CALCULATED.3IONS-SCNC: 18 MMOL/L (ref 7–19)
AST SERPL-CCNC: 18 U/L (ref 5–40)
BILIRUB SERPL-MCNC: 0.7 MG/DL (ref 0.2–1.2)
BILIRUBIN, POC: NORMAL
BLOOD URINE, POC: NORMAL
BUN BLDV-MCNC: 13 MG/DL (ref 8–23)
CALCIUM SERPL-MCNC: 8.8 MG/DL (ref 8.8–10.2)
CHLORIDE BLD-SCNC: 103 MMOL/L (ref 98–111)
CHOLESTEROL, TOTAL: 157 MG/DL (ref 160–199)
CLARITY, POC: CLEAR
CO2: 24 MMOL/L (ref 22–29)
COLOR, POC: YELLOW
CREAT SERPL-MCNC: 0.9 MG/DL (ref 0.5–1.2)
GFR NON-AFRICAN AMERICAN: >60
GLUCOSE BLD-MCNC: 103 MG/DL (ref 74–109)
GLUCOSE URINE, POC: NORMAL
HBA1C MFR BLD: 6 % (ref 4–6)
HDLC SERPL-MCNC: 51 MG/DL (ref 55–121)
KETONES, POC: NORMAL
LDL CHOLESTEROL CALCULATED: 92 MG/DL
LEUKOCYTE EST, POC: NORMAL
NITRITE, POC: NORMAL
PH, POC: 6.5
POTASSIUM SERPL-SCNC: 3.6 MMOL/L (ref 3.5–5)
PROTEIN, POC: NORMAL
SODIUM BLD-SCNC: 145 MMOL/L (ref 136–145)
SPECIFIC GRAVITY, POC: 1.01
TOTAL PROTEIN: 6.8 G/DL (ref 6.6–8.7)
TRIGL SERPL-MCNC: 69 MG/DL (ref 0–149)
UROBILINOGEN, POC: NORMAL

## 2020-01-09 PROCEDURE — G8427 DOCREV CUR MEDS BY ELIG CLIN: HCPCS | Performed by: NURSE PRACTITIONER

## 2020-01-09 PROCEDURE — 3017F COLORECTAL CA SCREEN DOC REV: CPT | Performed by: NURSE PRACTITIONER

## 2020-01-09 PROCEDURE — 1123F ACP DISCUSS/DSCN MKR DOCD: CPT | Performed by: NURSE PRACTITIONER

## 2020-01-09 PROCEDURE — 93000 ELECTROCARDIOGRAM COMPLETE: CPT | Performed by: NURSE PRACTITIONER

## 2020-01-09 PROCEDURE — 81002 URINALYSIS NONAUTO W/O SCOPE: CPT | Performed by: NURSE PRACTITIONER

## 2020-01-09 PROCEDURE — G8484 FLU IMMUNIZE NO ADMIN: HCPCS | Performed by: NURSE PRACTITIONER

## 2020-01-09 PROCEDURE — 99213 OFFICE O/P EST LOW 20 MIN: CPT | Performed by: NURSE PRACTITIONER

## 2020-01-09 PROCEDURE — 4040F PNEUMOC VAC/ADMIN/RCVD: CPT | Performed by: NURSE PRACTITIONER

## 2020-01-09 PROCEDURE — G8417 CALC BMI ABV UP PARAM F/U: HCPCS | Performed by: NURSE PRACTITIONER

## 2020-01-09 PROCEDURE — 36415 COLL VENOUS BLD VENIPUNCTURE: CPT | Performed by: NURSE PRACTITIONER

## 2020-01-09 PROCEDURE — 1036F TOBACCO NON-USER: CPT | Performed by: NURSE PRACTITIONER

## 2020-01-09 RX ORDER — TAMSULOSIN HYDROCHLORIDE 0.4 MG/1
0.4 CAPSULE ORAL DAILY
Qty: 30 CAPSULE | Refills: 5 | Status: SHIPPED | OUTPATIENT
Start: 2020-01-09 | End: 2020-06-16

## 2020-01-09 RX ORDER — METOPROLOL SUCCINATE 50 MG/1
TABLET, EXTENDED RELEASE ORAL
Qty: 60 TABLET | Refills: 3 | Status: SHIPPED | OUTPATIENT
Start: 2020-01-09 | End: 2020-04-02

## 2020-01-09 RX ORDER — AMLODIPINE BESYLATE AND BENAZEPRIL HYDROCHLORIDE 5; 20 MG/1; MG/1
1 CAPSULE ORAL DAILY
Qty: 30 CAPSULE | Refills: 0 | Status: SHIPPED | OUTPATIENT
Start: 2020-01-09 | End: 2020-01-31

## 2020-01-09 ASSESSMENT — PATIENT HEALTH QUESTIONNAIRE - PHQ9
SUM OF ALL RESPONSES TO PHQ9 QUESTIONS 1 & 2: 0
1. LITTLE INTEREST OR PLEASURE IN DOING THINGS: 0
SUM OF ALL RESPONSES TO PHQ QUESTIONS 1-9: 0
SUM OF ALL RESPONSES TO PHQ QUESTIONS 1-9: 0
2. FEELING DOWN, DEPRESSED OR HOPELESS: 0

## 2020-01-09 NOTE — PROGRESS NOTES
[address] [address] [place] [address]  Dept: [phone]  Dept Fax: [phone]  Loc: [phone]    Dallas Gil is a 75 y.o. male who presents today for his medical conditions/complaints as noted below. Dallas Gil is c/o of Hypertension (for a couple weeks, pt stated that the highest his BP has got was 200/110 and the lowest was 170/95)        HPI:     HPI   Chief Complaint   Patient presents with    Hypertension     for a couple weeks, pt stated that the highest his BP has got was 200/110 and the lowest was 170/95   he is on the lisinopril 20mg twice a day. And metoprolol XR 50 mg. He has noticed that his blood pressure has been elevated over the last couple months. And going over his medications I realized that the patient should not be on lisinopril in reviewing the last note he should be on the amlodipine Benzapril and the metoprolol tartrate if the XL. It appears that the pharmacy sent a request some time for refills on the lisinopril and we refilled it. He assumed that he was supposed to change back to it for some reason and did. He is scared to take the clonidine because he took it 1 time and his heart rate dropped to 40. On the metoprolol  his heart rate usually runs between 50 and 60. He denies any chest pain or shortness of breath. He complains of getting up during the night having to urinate 3 or 4 times a night which is fairly a new thing over the last few months.   Past Medical History:   Diagnosis Date    Borderline diabetes     Chronic back pain     Hyperlipidemia     Hypertension     Obesity       Past Surgical History:   Procedure Laterality Date    ACHILLES TENDON SURGERY      COLONOSCOPY  12/16/15    [facility] at [facility], normal    EYE SURGERY Bilateral     lens replaced    PRE-MALIGNANT / BENIGN SKIN LESION EXCISION  2017    RETINAL DETACHMENT SURGERY Left 10/14    RETINAL DETACHMENT SURGERY Left 07/2018    VASCULAR SURGERY  11/4/15 [facility]    Aortorenal arteriogram with oblique views of the renal arteries. Vitals 1/9/2020 1/9/2020 3/12/2019 3/12/2019 9/10/2018 9/64/6526   SYSTOLIC 785 [phone]   DIASTOLIC 90 966 90 [phone] 96   Site - Left Upper Arm - - - Left Upper Arm   Position - Sitting - - - Sitting   Cuff Size - Large Adult - - - Medium Adult   Pulse - 50 - 68 - 71   Temp - 97.8 - 97.4 - 97.1   Resp - 18 - - - 16   SpO2 - 97 - 96 - 96   Weight - 272 lb - 286 lb - 289 lb 6.4 oz   Height - 5' 9\" - 5' 9\" - 5' 9\"   BMI (wt*703/ht~2) - 40.16 kg/m2 - 42.23 kg/m2 - 42.73 kg/m2   Some recent data might be hidden       Family History   Problem Relation Age of Onset    Heart Disease Mother     High Blood Pressure Mother     Heart Disease Father     High Blood Pressure Father        Social History     Tobacco Use    Smoking status: Never Smoker    Smokeless tobacco: Never Used   Substance Use Topics    Alcohol use: No      Current Outpatient Medications   Medication Sig Dispense Refill    metoprolol succinate (TOPROL XL) 50 MG extended release tablet TAKE 1-2  TABLET BY MOUTH DAILY for blood pressure 60 tablet 3    amLODIPine-benazepril (LOTREL) 5-20 MG per capsule Take 1 capsule by mouth daily 30 capsule 0    pravastatin (PRAVACHOL) 40 MG tablet TAKE 2 TABLETS NIGHTLY 180 tablet 0     No current facility-administered medications for this visit.       No Known Allergies    Health Maintenance   Topic Date Due    Hepatitis C screen  1955    DTaP/Tdap/Td vaccine (1 - Tdap) 01/25/1964    Shingles Vaccine (1 of 2) 01/25/2003    Flu vaccine (1) 09/01/2019    Annual Wellness Visit (AWV)  03/11/2020    Colon cancer screen colonoscopy  12/16/2020    A1C test (Diabetic or Prediabetic)  01/09/2021    Lipid screen  01/09/2021    Potassium monitoring  01/09/2021    Creatinine monitoring  01/09/2021    Pneumococcal 65+ years Vaccine  Completed       Subjective:      Review of Systems control     Patient given educational materials -see patient instructions. Discussed use, benefit, and side effects of prescribed medications. All patient questions answered. Pt voiced understanding. Reviewed health maintenance. Instructed to continue currentmedications, diet and exercise. Patient agreed with treatment plan. Follow up as directed. MEDICATIONS:  Orders Placed This Encounter   Medications    metoprolol succinate (TOPROL XL) 50 MG extended release tablet     Sig: TAKE 1-2  TABLET BY MOUTH DAILY for blood pressure     Dispense:  60 tablet     Refill:  3     DO NOT GIVE LISINOPRIL IT IS D/C    amLODIPine-benazepril (LOTREL) 5-20 MG per capsule     Sig: Take 1 capsule by mouth daily     Dispense:  30 capsule     Refill:  0     DO NOT GIVE LISINOPRIL IT IS D/C         ORDERS:  Orders Placed This Encounter   Procedures    Comprehensive Metabolic Panel    Hemoglobin A1C    Lipid Panel    POCT Urinalysis no Micro    EKG 12 Lead       Follow-up:  No follow-ups on file. PATIENT INSTRUCTIONS:  Patient Instructions     Stop lisinopril twice aday  Start back on the amoldipine /benazepril  Continue the metoprolol in the am and if high take second dose in PM  Monitor your blood pressure every a.m And once random during the day. Record them. The goal is top number under 140 and bottom number under 80. Monitor heart rate if lower than 50 call me and we will increase the sanjay/benazapril  May do flomax if kidney function good  Patient Education        Benign Prostatic Hyperplasia: Care Instructions  Your Care Instructions    Benign prostatic hyperplasia, or BPH, is an enlarged prostate gland. The prostate is a small gland that makes some of the fluid in semen. Prostate enlargement happens to almost all men as they age. It is usually not serious. BPH does not cause prostate cancer. As the prostate gets bigger, it may partly block the flow of urine.  You may have a hard time getting a urine stream started or completely stopped. BPH can cause dribbling. You may have a weak urine stream, or you may have to urinate more often than you used to, especially at night. Most men find these problems easy to manage. You do not need treatment unless your symptoms bother you a lot or you have other problems, such as bladder infections or stones. In these cases, medicines may help. Surgery is not needed unless the urine flow is blocked or the symptoms do not get better with medicine. Follow-up care is a key part of your treatment and safety. Be sure to make and go to all appointments, and call your doctor if you are having problems. It's also a good idea to know your test results and keep a list of the medicines you take. How can you care for yourself at home? · Take plenty of time to urinate. Try to relax. · Try \"double voiding. \" Urinate as much you can, relax for a few moments, and then try to urinate again. · Sit on the toilet to urinate. · Read or think of other things while you are waiting. · Turn on a faucet, or try to picture running water. Some men find that this helps get their urine flowing. · If dribbling is a problem, wash your penis daily to avoid skin irritation and infection. · Avoid caffeine and alcohol. These drinks will increase how often you need to urinate. Spread your fluid intake throughout the day. If the urge to urinate often wakes you at night, limit your fluid intake in the evening. Urinate right before you go to bed. · Many over-the-counter cold and allergy medicines can make the symptoms of BPH worse. Avoid antihistamines, decongestants, and allergy pills, if you can. Read the warnings on the package. · If you take any prescription medicines, especially tranquilizers or antidepressants, ask your doctor or pharmacist whether they can cause urination problems. There may be other medicines you can use that do not cause urinary problems. · Be safe with medicines.  Take your medicines

## 2020-01-09 NOTE — PATIENT INSTRUCTIONS
Stop lisinopril twice aday  Start back on the amoldipine /benazepril  Continue the metoprolol in the am and if high take second dose in PM  Monitor your blood pressure every a.m And once random during the day. Record them. The goal is top number under 140 and bottom number under 80. Monitor heart rate if lower than 50 call me and we will increase the sanjay/benazapril  May do flomax if kidney function good  Patient Education        Benign Prostatic Hyperplasia: Care Instructions  Your Care Instructions    Benign prostatic hyperplasia, or BPH, is an enlarged prostate gland. The prostate is a small gland that makes some of the fluid in semen. Prostate enlargement happens to almost all men as they age. It is usually not serious. BPH does not cause prostate cancer. As the prostate gets bigger, it may partly block the flow of urine. You may have a hard time getting a urine stream started or completely stopped. BPH can cause dribbling. You may have a weak urine stream, or you may have to urinate more often than you used to, especially at night. Most men find these problems easy to manage. You do not need treatment unless your symptoms bother you a lot or you have other problems, such as bladder infections or stones. In these cases, medicines may help. Surgery is not needed unless the urine flow is blocked or the symptoms do not get better with medicine. Follow-up care is a key part of your treatment and safety. Be sure to make and go to all appointments, and call your doctor if you are having problems. It's also a good idea to know your test results and keep a list of the medicines you take. How can you care for yourself at home? · Take plenty of time to urinate. Try to relax. · Try \"double voiding. \" Urinate as much you can, relax for a few moments, and then try to urinate again. · Sit on the toilet to urinate. · Read or think of other things while you are waiting.   · Turn on a faucet, or try to picture running water. Some men find that this helps get their urine flowing. · If dribbling is a problem, wash your penis daily to avoid skin irritation and infection. · Avoid caffeine and alcohol. These drinks will increase how often you need to urinate. Spread your fluid intake throughout the day. If the urge to urinate often wakes you at night, limit your fluid intake in the evening. Urinate right before you go to bed. · Many over-the-counter cold and allergy medicines can make the symptoms of BPH worse. Avoid antihistamines, decongestants, and allergy pills, if you can. Read the warnings on the package. · If you take any prescription medicines, especially tranquilizers or antidepressants, ask your doctor or pharmacist whether they can cause urination problems. There may be other medicines you can use that do not cause urinary problems. · Be safe with medicines. Take your medicines exactly as prescribed. Call your doctor if you think you are having a problem with your medicine. When should you call for help? Call your doctor now or seek immediate medical care if:    · You cannot urinate at all.     · You have symptoms of a urinary infection. For example:  ? You have blood or pus in your urine. ? You have pain in your back just below your rib cage. This is called flank pain. ? You have a fever, chills, or body aches. ? It hurts to urinate. ? You have groin or belly pain.    Watch closely for changes in your health, and be sure to contact your doctor if:    · It hurts when you ejaculate.     · Your urinary problems get a lot worse or bother you a lot. Where can you learn more? Go to [email or web address] org and sign in to your [employer] account. Enter T278 in the [facility] box to learn more about \"Benign Prostatic Hyperplasia: Care Instructions. \"     If you do not have an account, please click on the \"Sign Up Now\" link.   Current as of: May 28, 2019  Content Version: 12.3  © 9120-5279 [employer]. Care instructions adapted under license by [facility] ([facility]). If you have questions about a medical condition or this instruction, always ask your healthcare professional. [address] any warranty or liability for your use of this information.

## 2020-01-31 RX ORDER — AMLODIPINE BESYLATE AND BENAZEPRIL HYDROCHLORIDE 5; 20 MG/1; MG/1
CAPSULE ORAL
Qty: 30 CAPSULE | Refills: 0 | Status: SHIPPED | OUTPATIENT
Start: 2020-01-31 | End: 2020-02-25

## 2020-02-25 RX ORDER — PRAVASTATIN SODIUM 40 MG
TABLET ORAL
Qty: 180 TABLET | Refills: 3 | Status: SHIPPED | OUTPATIENT
Start: 2020-02-25 | End: 2021-03-08

## 2020-02-25 RX ORDER — AMLODIPINE BESYLATE AND BENAZEPRIL HYDROCHLORIDE 5; 20 MG/1; MG/1
CAPSULE ORAL
Qty: 30 CAPSULE | Refills: 3 | Status: SHIPPED | OUTPATIENT
Start: 2020-02-25 | End: 2020-06-16

## 2020-03-16 ENCOUNTER — OFFICE VISIT (OUTPATIENT)
Dept: PRIMARY CARE CLINIC | Age: 65
End: 2020-03-16
Payer: MEDICARE

## 2020-03-16 VITALS
OXYGEN SATURATION: 95 % | TEMPERATURE: 97.7 F | HEART RATE: 57 BPM | WEIGHT: 274 LBS | RESPIRATION RATE: 18 BRPM | HEIGHT: 69 IN | DIASTOLIC BLOOD PRESSURE: 86 MMHG | BODY MASS INDEX: 40.58 KG/M2 | SYSTOLIC BLOOD PRESSURE: 132 MMHG

## 2020-03-16 LAB — PROSTATE SPECIFIC ANTIGEN: 3.45 NG/ML (ref 0–4)

## 2020-03-16 PROCEDURE — 1036F TOBACCO NON-USER: CPT | Performed by: FAMILY MEDICINE

## 2020-03-16 PROCEDURE — G0439 PPPS, SUBSEQ VISIT: HCPCS | Performed by: FAMILY MEDICINE

## 2020-03-16 PROCEDURE — G8427 DOCREV CUR MEDS BY ELIG CLIN: HCPCS | Performed by: FAMILY MEDICINE

## 2020-03-16 PROCEDURE — G8417 CALC BMI ABV UP PARAM F/U: HCPCS | Performed by: FAMILY MEDICINE

## 2020-03-16 PROCEDURE — 4040F PNEUMOC VAC/ADMIN/RCVD: CPT | Performed by: FAMILY MEDICINE

## 2020-03-16 PROCEDURE — G8484 FLU IMMUNIZE NO ADMIN: HCPCS | Performed by: FAMILY MEDICINE

## 2020-03-16 PROCEDURE — 3017F COLORECTAL CA SCREEN DOC REV: CPT | Performed by: FAMILY MEDICINE

## 2020-03-16 PROCEDURE — 1123F ACP DISCUSS/DSCN MKR DOCD: CPT | Performed by: FAMILY MEDICINE

## 2020-03-16 PROCEDURE — 99214 OFFICE O/P EST MOD 30 MIN: CPT | Performed by: FAMILY MEDICINE

## 2020-03-16 PROCEDURE — 36415 COLL VENOUS BLD VENIPUNCTURE: CPT | Performed by: FAMILY MEDICINE

## 2020-03-16 RX ORDER — INDAPAMIDE 1.25 MG/1
1.25 TABLET, FILM COATED ORAL EVERY MORNING
Qty: 30 TABLET | Refills: 5 | Status: SHIPPED | OUTPATIENT
Start: 2020-03-16 | End: 2020-09-10

## 2020-03-16 ASSESSMENT — LIFESTYLE VARIABLES: HOW OFTEN DO YOU HAVE A DRINK CONTAINING ALCOHOL: 0

## 2020-03-16 ASSESSMENT — PATIENT HEALTH QUESTIONNAIRE - PHQ9
SUM OF ALL RESPONSES TO PHQ QUESTIONS 1-9: 0
SUM OF ALL RESPONSES TO PHQ QUESTIONS 1-9: 0

## 2020-03-16 NOTE — PROGRESS NOTES
Medicare Annual Wellness Visit  Name: Marie Butcher Date: 3/16/2020   MRN: [id number] Sex: Male   Age: 77 y.o. Ethnicity: Non-/Non    : 1955 Race: Bela Arredondo is here for Medicare AWV and Hypertension (pt states its better but still running high)    Screenings for behavioral, psychosocial and functional/safety risks, and cognitive dysfunction are all negative except as indicated below. These results, as well as other patient data from the [address] form, are documented in Flowsheets linked to this Encounter. No Known Allergies    Prior to Visit Medications    Medication Sig Taking? Authorizing Provider   pravastatin (PRAVACHOL) 40 MG tablet TAKE 2 TABLETS NIGHTLY Yes Odette Estrella MD   amLODIPine-benazepril (LOTREL) 5-20 MG per capsule TAKE 1 CAPSULE BY MOUTH EVERY DAY Yes DIONE Warner CNP   metoprolol succinate (TOPROL XL) 50 MG extended release tablet TAKE 1-2  TABLET BY MOUTH DAILY for blood pressure Yes DIONE Warner CNP   tamsulosin (FLOMAX) 0.4 MG capsule Take 1 capsule by mouth daily Yes DIONE Warner CNP       Past Medical History:   Diagnosis Date    Borderline diabetes     Chronic back pain     Hyperlipidemia     Hypertension     Obesity        Past Surgical History:   Procedure Laterality Date    ACHILLES TENDON SURGERY      COLONOSCOPY  12/16/15    Dr Phoenix Munson at [facility], [place]   [address] Bilateral     lens replaced    PRE-MALIGNANT / BENIGN SKIN LESION EXCISION  2017    RETINAL DETACHMENT SURGERY Left 10/14    RETINAL DETACHMENT SURGERY Left 2018    VASCULAR SURGERY  11/4/15 [facility] & [address]    Aortorenal arteriogram with oblique views of the renal arteries.        Family History   Problem Relation Age of Onset    Heart Disease Mother     High Blood Pressure Mother     Heart Disease Father     High Blood Pressure Father        CareTeam (Including outside providers/suppliers regularly involved in 05/29/2019    Flu vaccine (1) 09/01/2019    Colon cancer screen colonoscopy  12/16/2020    A1C test (Diabetic or Prediabetic)  01/09/2021    Lipid screen  01/09/2021    Potassium monitoring  01/09/2021    Creatinine monitoring  01/09/2021    Pneumococcal 65+ years Vaccine  Completed    Hepatitis A vaccine  Aged Out    Hepatitis B vaccine  Aged Out    Hib vaccine  Aged Out    Meningococcal (ACWY) vaccine  Aged Out     Recommendations for [employer] Due: see orders and patient instructions/AVS.  . Recommended screening schedule for the next 5-10 years is provided to the patient in written form: see Patient Instructions/AVS.    There are no diagnoses linked to this encounter.

## 2020-03-16 NOTE — PROGRESS NOTES
SUBJECTIVE:   José Luis Small is a 77 y.o. male presenting for his annual checkup. He is not a diabetic so this was removed from his [facility] [address] gap. He had blood work done in January 2020. Total cholesterol was 157 with triglycerides of 69. HDL was low at 51. LDL was 92. Hemoglobin A1c was 6. Urine was normal and CMP was completely normal. He cut out soft drinks and dropped 12 pounds and that is why he thinks his sugar is better. He is on Flomax which has definitely helped his urine stream flow but he still gets up frequently at night to urinate. He goes back to sleep fairly quickly but he still gets up 3 or 4 times a day. 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 He struggles with blood pressure. He had become a little mixed up with his medications back at the 1st of the year and he was placed on an amlodipine benazepril combination. At home blood pressures usually 145/89. He says that is the best that he can get it. For some reason today it's better here but he just doesn't trust this value. He denies chest pain or shortness of breath. Patient Active Problem List    Diagnosis Date Noted    Hyperlipidemia     Hypertension     Obesity      Current Outpatient Medications   Medication Sig Dispense Refill    indapamide (LOZOL) 1.25 MG tablet Take 1 tablet by mouth every morning For high blood pressure 30 tablet 5    pravastatin (PRAVACHOL) 40 MG tablet TAKE 2 TABLETS NIGHTLY 180 tablet 3    amLODIPine-benazepril (LOTREL) 5-20 MG per capsule TAKE 1 CAPSULE BY MOUTH EVERY DAY 30 capsule 3    metoprolol succinate (TOPROL XL) 50 MG extended release tablet TAKE 1-2  TABLET BY MOUTH DAILY for blood pressure 60 tablet 3    tamsulosin (FLOMAX) 0.4 MG capsule Take 1 capsule by mouth daily 30 capsule 5     No current facility-administered medications for this visit.       Past Medical History:   Diagnosis Date    Borderline diabetes     Chronic back pain     Essential hypertension    4. Screening for prostate cancer    5. Nocturia        PLAN:   Lifestyle advice:   MEDICATIONS:  Orders Placed This Encounter   Medications    indapamide (LOZOL) 1.25 MG tablet     Sig: Take 1 tablet by mouth every morning For high blood pressure     Dispense:  30 tablet     Refill:  5     Continue current medications. We will refill when needed. ORDERS:  Orders Placed This Encounter   Procedures    Psa screening     I am going to check a PSA. We did discuss the risks and benefits of screening for prostate cancer. I'm going to add a diuretic to his blood pressure medications. He will return for a BMP and blood pressure check with the nurse in one month. He was given samples of Myrbetriq for his nocturia. He will take this along with his Flomax. I reviewed his labs with him. If the Myrbetriq does not help he is to let me know. We also discussed ways to make changes in his diet. Continue to stay as active as possible. Follow-up:  Return in 1 year (on 3/16/2021) for Medicare Annual Wellness Visit in 1 year. PATIENT INSTRUCTIONS:  Patient Instructions     Personalized Preventive Plan for Jesica Perez - 3/16/2020  Medicare offers a range of preventive health benefits. Some of the tests and screenings are paid in full while other may be subject to a deductible, co-insurance, and/or copay. Some of these benefits include a comprehensive review of your medical history including lifestyle, illnesses that may run in your family, and various assessments and screenings as appropriate. After reviewing your medical record and screening and assessments performed today your provider may have ordered immunizations, labs, imaging, and/or referrals for you. A list of these orders (if applicable) as well as your Preventive Care list are included within your After Visit Summary for your review.     Other Preventive Recommendations:    · A preventive eye exam performed by an eye include table salt (about 50% sodium), processed foods, convenience foods, and preserved foods. Cholesterol    Cholesterol is a fat-like, waxy substance in your blood. Our bodies make some cholesterol. It is also found in animal products, with the highest amounts in fatty meat, egg yolks, whole milk, cheese, shellfish, and organ meats. On a heart-healthy diet, you should limit your cholesterol intake to less than 200 mg per day. It is normal and important to have some cholesterol in your bloodstream. But too much cholesterol can cause plaque to build up within your arteries, which can eventually lead to a heart attack or stroke. The two types of cholesterol that are most commonly referred to are:   Low-density lipoprotein (LDL) cholesterol  Also known as bad cholesterol, this is the cholesterol that tends to build up along your arteries. Bad cholesterol levels are increased by eating fats that are saturated or hydrogenated. Optimal level of this cholesterol is less than 100. Over 130 starts to get risky for heart disease. High-density lipoprotein (HDL) cholesterol  Also known as good cholesterol, this type of cholesterol actually carries cholesterol away from your arteries and may, therefore, help lower your risk of having a heart attack. You want this level to be high (ideally greater than 60). It is a risk to have a level less than 40. You can raise this good cholesterol by eating olive oil, canola oil, avocados, or nuts. Exercise raises this level, too. Fat    Fat is calorie dense and packs a lot of calories into a small amount of food. Even though fats should be limited due to their high calorie content, not all fats are bad. In fact, some fats are quite healthful. Fat can be broken down into four main types.    The good-for-you fats are:   Monounsaturated fat  found in oils such as olive and canola, avocados, and nuts and natural nut butters; can decrease cholesterol levels, while keeping levels of HDL cholesterol high   Polyunsaturated fat  found in oils such as safflower, sunflower, soybean, corn, and sesame; can decrease total cholesterol and LDL cholesterol   Omega-3 fatty acids  particularly those found in fatty fish (such as salmon, trout, tuna, mackerel, herring, and sardines); can decrease risk of arrhythmias, decrease triglyceride levels, and slightly lower blood pressure   The fats that you want to limit are:   Saturated fat  found in animal products, many fast foods, and a few vegetables; increases total blood cholesterol, including LDL levels   Animal fats that are saturated include: butter, lard, whole-milk dairy products, meat fat, and poultry skin   Vegetable fats that are saturated include: hydrogenated shortening, palm oil, coconut oil, cocoa butter   Hydrogenated or trans fat  found in margarine and vegetable shortening, most shelf stable snack foods, and fried foods; increases LDL and decreases HDL     It is generally recommended that you limit your total fat for the day to less than 30% of your total calories. If you follow an 1800-calorie heart healthy diet, for example, this would mean 60 grams of fat or less per day. Saturated fat and trans fat in your diet raises your blood cholesterol the most, much more than dietary cholesterol does. For this reason, on a heart-healthy diet, less than 7% of your calories should come from saturated fat and ideally 0% from trans fat. On an 1800-calorie diet, this translates into less than 14 grams of saturated fat per day, leaving 46 grams of fat to come from mono- and polyunsaturated fats.    Food Choices on a Heart Healthy Diet   Food Category   Foods Recommended   Foods to Avoid   Grains   Breads and rolls without salted tops Most dry and cooked cereals Unsalted crackers and breadsticks Low-sodium or homemade breadcrumbs or stuffing All rice and pastas   Breads, rolls, and crackers with salted tops High-fat baked goods (eg, muffins, donuts, pastries) Quick breads, self-rising flour, and biscuit mixes Regular bread crumbs Instant hot cereals Commercially prepared rice, pasta, or stuffing mixes   Vegetables   Most fresh, frozen, and low-sodium canned vegetables Low-sodium and salt-free vegetable juices Canned vegetables if unsalted or rinsed   Regular canned vegetables and juices, including sauerkraut and pickled vegetables Frozen vegetables with sauces Commercially prepared potato and vegetable mixes   Fruits   Most fresh, frozen, and canned fruits All fruit juices   Fruits processed with salt or sodium   Milk   Nonfat or low-fat (1%) milk Nonfat or low-fat yogurt Cottage cheese, low-fat ricotta, cheeses labeled as low-fat and low-sodium   Whole milk Reduced-fat (2%) milk Malted and chocolate milk Full fat yogurt Most cheeses (unless low-fat and low salt) Buttermilk (no more than 1 cup per week)   Meats and Beans   Lean cuts of fresh or frozen beef, veal, lamb, or pork (look for the word loin) Fresh or frozen poultry without the skin Fresh or frozen fish and some shellfish Egg whites and egg substitutes (Limit whole eggs to three per week) Tofu Nuts or seeds (unsalted, dry-roasted), low-sodium peanut butter Dried peas, beans, and lentils   Any smoked, cured, salted, or canned meat, fish, or poultry (including boyd, chipped beef, cold cuts, hot dogs, sausages, sardines, and anchovies) Poultry skins Breaded and/or fried fish or meats Canned peas, beans, and lentils Salted nuts   Fats and Oils   Olive oil and canola oil Low-sodium, low-fat salad dressings and mayonnaise   Butter, margarine, coconut and palm oils, boyd fat   Snacks, Sweets, and Condiments   Low-sodium or unsalted versions of broths, soups, soy sauce, and condiments Pepper, herbs, and spices; vinegar, lemon, or lime juice Low-fat frozen desserts (yogurt, sherbet, fruit bars) Sugar, cocoa powder, honey, syrup, jam, and preserves Low-fat, trans-fat free cookies, cakes, and pies Tonna Corn and animal crackers, fig bars, jaja snaps   High-fat desserts Broth, soups, gravies, and sauces, made from instant mixes or other high-sodium ingredients Salted snack foods Canned olives Meat tenderizers, seasoning salt, and most flavored vinegars   Beverages   Low-sodium carbonated beverages Tea and coffee in moderation Soy milk   Commercially softened water   Suggestions   Make whole grains, fruits, and vegetables the base of your diet. Choose heart-healthy fats such as canola, olive, and flaxseed oil, and foods high in heart-healthy fats, such as nuts, seeds, soybeans, tofu, and fish. Eat fish at least twice per week; the fish highest in omega-3 fatty acids and lowest in mercury include salmon, herring, mackerel, sardines, and canned chunk light tuna. If you eat fish less than twice per week or have high triglycerides, talk to your doctor about taking fish oil supplements. Read food labels. For products low in fat and cholesterol, look for fat free, low-fat, cholesterol free, saturated fat free, and trans fat freeAlso scan the Nutrition Facts Label, which lists saturated fat, trans fat, and cholesterol amounts. For products low in sodium, look for sodium free, very low sodium, low sodium, no added salt, and unsalted   Skip the salt when cooking or at the table; if food needs more flavor, get creative and try out different herbs and spices. Garlic and onion also add substantial flavor to foods. Trim any visible fat off meat and poultry before cooking, and drain the fat off after savage. Use cooking methods that require little or no added fat, such as grilling, boiling, baking, poaching, broiling, roasting, steaming, stir-frying, and sauting. Avoid fast food and convenience food. They tend to be high in saturated and trans fat and have a lot of added salt. Talk to a registered dietitian for individualized diet advice.       Last Reviewed: March 2011 Erica Yun MS, MPH, RD   Updated: 3/29/2011 ·       EMR Dragon/transcription disclaimer:  Much of this encounter note is electronic transcription/translation of spoken language to printed texts. The electronic translation of spoken language may be erroneous, or at times, nonsensical words or phrases may be inadvertently transcribed.   Although I have reviewed the note for such errors, some may still exist.

## 2020-03-16 NOTE — PATIENT INSTRUCTIONS
cholesterol, this type of cholesterol actually carries cholesterol away from your arteries and may, therefore, help lower your risk of having a heart attack. You want this level to be high (ideally greater than 60). It is a risk to have a level less than 40. You can raise this good cholesterol by eating olive oil, canola oil, avocados, or nuts. Exercise raises this level, too. Fat    Fat is calorie dense and packs a lot of calories into a small amount of food. Even though fats should be limited due to their high calorie content, not all fats are bad. In fact, some fats are quite healthful. Fat can be broken down into four main types. The good-for-you fats are:   Monounsaturated fat  found in oils such as olive and canola, avocados, and nuts and natural nut butters; can decrease cholesterol levels, while keeping levels of HDL cholesterol high   Polyunsaturated fat  found in oils such as safflower, sunflower, soybean, corn, and sesame; can decrease total cholesterol and LDL cholesterol   Omega-3 fatty acids  particularly those found in fatty fish (such as salmon, trout, tuna, mackerel, herring, and sardines); can decrease risk of arrhythmias, decrease triglyceride levels, and slightly lower blood pressure   The fats that you want to limit are:   Saturated fat  found in animal products, many fast foods, and a few vegetables; increases total blood cholesterol, including LDL levels   Animal fats that are saturated include: butter, lard, whole-milk dairy products, meat fat, and poultry skin   Vegetable fats that are saturated include: hydrogenated shortening, palm oil, coconut oil, cocoa butter   Hydrogenated or trans fat  found in margarine and vegetable shortening, most shelf stable snack foods, and fried foods; increases LDL and decreases HDL     It is generally recommended that you limit your total fat for the day to less than 30% of your total calories.  If you follow an 1800-calorie heart healthy diet, for example, this would mean 60 grams of fat or less per day. Saturated fat and trans fat in your diet raises your blood cholesterol the most, much more than dietary cholesterol does. For this reason, on a heart-healthy diet, less than 7% of your calories should come from saturated fat and ideally 0% from trans fat. On an 1800-calorie diet, this translates into less than 14 grams of saturated fat per day, leaving 46 grams of fat to come from mono- and polyunsaturated fats.    Food Choices on a Heart Healthy Diet   Food Category   Foods Recommended   Foods to Avoid   Grains   Breads and rolls without salted tops Most dry and cooked cereals Unsalted crackers and breadsticks Low-sodium or homemade breadcrumbs or stuffing All rice and pastas   Breads, rolls, and crackers with salted tops High-fat baked goods (eg, muffins, donuts, pastries) Quick breads, self-rising flour, and biscuit mixes Regular bread crumbs Instant hot cereals Commercially prepared rice, pasta, or stuffing mixes   Vegetables   Most fresh, frozen, and low-sodium canned vegetables Low-sodium and salt-free vegetable juices Canned vegetables if unsalted or rinsed   Regular canned vegetables and juices, including sauerkraut and pickled vegetables Frozen vegetables with sauces Commercially prepared potato and vegetable mixes   Fruits   Most fresh, frozen, and canned fruits All fruit juices   Fruits processed with salt or sodium   Milk   Nonfat or low-fat (1%) milk Nonfat or low-fat yogurt Cottage cheese, low-fat ricotta, cheeses labeled as low-fat and low-sodium   Whole milk Reduced-fat (2%) milk Malted and chocolate milk Full fat yogurt Most cheeses (unless low-fat and low salt) Buttermilk (no more than 1 cup per week)   Meats and Beans   Lean cuts of fresh or frozen beef, veal, lamb, or pork (look for the word loin) Fresh or frozen poultry without the skin Fresh or frozen fish and some shellfish Egg whites and egg substitutes (Limit whole eggs to three per week) Tofu Nuts or seeds (unsalted, dry-roasted), low-sodium peanut butter Dried peas, beans, and lentils   Any smoked, cured, salted, or canned meat, fish, or poultry (including boyd, chipped beef, cold cuts, hot dogs, sausages, sardines, and anchovies) Poultry skins Breaded and/or fried fish or meats Canned peas, beans, and lentils Salted nuts   Fats and Oils   Olive oil and canola oil Low-sodium, low-fat salad dressings and mayonnaise   Butter, margarine, coconut and palm oils, boyd fat   Snacks, Sweets, and Condiments   Low-sodium or unsalted versions of broths, soups, soy sauce, and condiments Pepper, herbs, and spices; vinegar, lemon, or lime juice Low-fat frozen desserts (yogurt, sherbet, fruit bars) Sugar, cocoa powder, honey, syrup, jam, and preserves Low-fat, trans-fat free cookies, cakes, and pies Noah and animal crackers, fig bars, jaja snaps   High-fat desserts Broth, soups, gravies, and sauces, made from instant mixes or other high-sodium ingredients Salted snack foods Canned olives Meat tenderizers, seasoning salt, and most flavored vinegars   Beverages   Low-sodium carbonated beverages Tea and coffee in moderation Soy milk   Commercially softened water   Suggestions   Make whole grains, fruits, and vegetables the base of your diet. Choose heart-healthy fats such as canola, olive, and flaxseed oil, and foods high in heart-healthy fats, such as nuts, seeds, soybeans, tofu, and fish. Eat fish at least twice per week; the fish highest in omega-3 fatty acids and lowest in mercury include salmon, herring, mackerel, sardines, and canned chunk light tuna. If you eat fish less than twice per week or have high triglycerides, talk to your doctor about taking fish oil supplements. Read food labels.    For products low in fat and cholesterol, look for fat free, low-fat, cholesterol free, saturated fat free, and trans fat freeAlso scan the Nutrition Facts Label, which lists saturated fat, trans fat,

## 2020-04-02 RX ORDER — METOPROLOL SUCCINATE 50 MG/1
TABLET, EXTENDED RELEASE ORAL
Qty: 180 TABLET | Refills: 1 | Status: SHIPPED | OUTPATIENT
Start: 2020-04-02 | End: 2020-09-14 | Stop reason: SDUPTHER

## 2020-04-16 ENCOUNTER — NURSE ONLY (OUTPATIENT)
Dept: PRIMARY CARE CLINIC | Age: 65
End: 2020-04-16
Payer: MEDICARE

## 2020-04-16 VITALS — SYSTOLIC BLOOD PRESSURE: 122 MMHG | DIASTOLIC BLOOD PRESSURE: 82 MMHG

## 2020-04-16 LAB
ANION GAP SERPL CALCULATED.3IONS-SCNC: 16 MMOL/L (ref 7–19)
BUN BLDV-MCNC: 21 MG/DL (ref 8–23)
CALCIUM SERPL-MCNC: 9 MG/DL (ref 8.8–10.2)
CHLORIDE BLD-SCNC: 101 MMOL/L (ref 98–111)
CO2: 26 MMOL/L (ref 22–29)
CREAT SERPL-MCNC: 1 MG/DL (ref 0.5–1.2)
GFR NON-AFRICAN AMERICAN: >60
GLUCOSE BLD-MCNC: 115 MG/DL (ref 74–109)
POTASSIUM SERPL-SCNC: 3.4 MMOL/L (ref 3.5–5)
SODIUM BLD-SCNC: 143 MMOL/L (ref 136–145)

## 2020-04-16 PROCEDURE — 36415 COLL VENOUS BLD VENIPUNCTURE: CPT | Performed by: FAMILY MEDICINE

## 2020-04-21 ENCOUNTER — TELEPHONE (OUTPATIENT)
Dept: PRIMARY CARE CLINIC | Age: 65
End: 2020-04-21

## 2020-04-22 NOTE — TELEPHONE ENCOUNTER
Pt will wait to think about this. He just needs the med when he exerts himself. He does not want to take anything daily. He had that script since 2018. He does good until yard work or other strenuous activity occurs.

## 2020-06-16 RX ORDER — TAMSULOSIN HYDROCHLORIDE 0.4 MG/1
CAPSULE ORAL
Qty: 90 CAPSULE | Refills: 1 | Status: SHIPPED | OUTPATIENT
Start: 2020-06-16 | End: 2021-01-11

## 2020-06-16 RX ORDER — AMLODIPINE BESYLATE AND BENAZEPRIL HYDROCHLORIDE 5; 20 MG/1; MG/1
CAPSULE ORAL
Qty: 90 CAPSULE | Refills: 1 | Status: SHIPPED | OUTPATIENT
Start: 2020-06-16 | End: 2020-12-17 | Stop reason: SDUPTHER

## 2020-09-10 RX ORDER — INDAPAMIDE 1.25 MG/1
1.25 TABLET, FILM COATED ORAL EVERY MORNING
Qty: 90 TABLET | Refills: 1 | Status: SHIPPED | OUTPATIENT
Start: 2020-09-10 | End: 2020-12-29 | Stop reason: SDUPTHER

## 2020-09-14 RX ORDER — METOPROLOL SUCCINATE 50 MG/1
50 TABLET, EXTENDED RELEASE ORAL DAILY
Qty: 180 TABLET | Refills: 3 | Status: SHIPPED | OUTPATIENT
Start: 2020-09-14 | End: 2020-12-28 | Stop reason: SDUPTHER

## 2020-12-17 RX ORDER — AMLODIPINE BESYLATE AND BENAZEPRIL HYDROCHLORIDE 5; 20 MG/1; MG/1
1 CAPSULE ORAL DAILY
Qty: 90 CAPSULE | Refills: 3 | Status: SHIPPED | OUTPATIENT
Start: 2020-12-17 | End: 2021-12-22

## 2020-12-28 RX ORDER — METOPROLOL SUCCINATE 50 MG/1
50 TABLET, EXTENDED RELEASE ORAL 2 TIMES DAILY
Qty: 180 TABLET | Refills: 3 | Status: SHIPPED | OUTPATIENT
Start: 2020-12-28 | End: 2021-03-17

## 2020-12-29 RX ORDER — INDAPAMIDE 1.25 MG/1
1.25 TABLET, FILM COATED ORAL EVERY MORNING
Qty: 90 TABLET | Refills: 3 | Status: SHIPPED | OUTPATIENT
Start: 2020-12-29 | End: 2022-01-26

## 2021-01-11 RX ORDER — TAMSULOSIN HYDROCHLORIDE 0.4 MG/1
CAPSULE ORAL
Qty: 90 CAPSULE | Refills: 1 | Status: SHIPPED | OUTPATIENT
Start: 2021-01-11 | End: 2021-08-03 | Stop reason: SDUPTHER

## 2021-03-08 RX ORDER — PRAVASTATIN SODIUM 40 MG
TABLET ORAL
Qty: 180 TABLET | Refills: 3 | Status: SHIPPED | OUTPATIENT
Start: 2021-03-08 | End: 2021-04-13 | Stop reason: DRUGHIGH

## 2021-03-10 ASSESSMENT — LIFESTYLE VARIABLES
HOW OFTEN DO YOU HAVE A DRINK CONTAINING ALCOHOL: NEVER
AUDIT TOTAL SCORE: INCOMPLETE
AUDIT-C TOTAL SCORE: INCOMPLETE

## 2021-03-10 ASSESSMENT — PATIENT HEALTH QUESTIONNAIRE - PHQ9
SUM OF ALL RESPONSES TO PHQ9 QUESTIONS 1 & 2: 2
2. FEELING DOWN, DEPRESSED OR HOPELESS: 1
SUM OF ALL RESPONSES TO PHQ QUESTIONS 1-9: 2

## 2021-03-17 ENCOUNTER — OFFICE VISIT (OUTPATIENT)
Dept: PRIMARY CARE CLINIC | Age: 66
End: 2021-03-17
Payer: MEDICARE

## 2021-03-17 VITALS
OXYGEN SATURATION: 96 % | SYSTOLIC BLOOD PRESSURE: 120 MMHG | HEIGHT: 69 IN | TEMPERATURE: 97.3 F | WEIGHT: 287.6 LBS | BODY MASS INDEX: 42.6 KG/M2 | HEART RATE: 70 BPM | DIASTOLIC BLOOD PRESSURE: 82 MMHG

## 2021-03-17 DIAGNOSIS — E78.2 MIXED HYPERLIPIDEMIA: ICD-10-CM

## 2021-03-17 DIAGNOSIS — Z12.5 SCREENING FOR PROSTATE CANCER: ICD-10-CM

## 2021-03-17 DIAGNOSIS — E66.01 CLASS 3 SEVERE OBESITY WITHOUT SERIOUS COMORBIDITY WITH BODY MASS INDEX (BMI) OF 40.0 TO 44.9 IN ADULT, UNSPECIFIED OBESITY TYPE (HCC): ICD-10-CM

## 2021-03-17 DIAGNOSIS — I10 ESSENTIAL HYPERTENSION: ICD-10-CM

## 2021-03-17 DIAGNOSIS — Z12.11 SCREEN FOR COLON CANCER: ICD-10-CM

## 2021-03-17 DIAGNOSIS — Z00.00 ROUTINE GENERAL MEDICAL EXAMINATION AT A HEALTH CARE FACILITY: Primary | ICD-10-CM

## 2021-03-17 LAB
ALBUMIN SERPL-MCNC: 4.4 G/DL (ref 3.5–5.2)
ALP BLD-CCNC: 75 U/L (ref 40–130)
ALT SERPL-CCNC: 22 U/L (ref 5–41)
ANION GAP SERPL CALCULATED.3IONS-SCNC: 14 MMOL/L (ref 7–19)
AST SERPL-CCNC: 22 U/L (ref 5–40)
BILIRUB SERPL-MCNC: 0.9 MG/DL (ref 0.2–1.2)
BUN BLDV-MCNC: 19 MG/DL (ref 8–23)
CALCIUM SERPL-MCNC: 9.4 MG/DL (ref 8.8–10.2)
CHLORIDE BLD-SCNC: 99 MMOL/L (ref 98–111)
CHOLESTEROL, TOTAL: 179 MG/DL (ref 160–199)
CO2: 30 MMOL/L (ref 22–29)
CREAT SERPL-MCNC: 1.1 MG/DL (ref 0.5–1.2)
GFR AFRICAN AMERICAN: >59
GFR NON-AFRICAN AMERICAN: >60
GLUCOSE BLD-MCNC: 137 MG/DL (ref 74–109)
HDLC SERPL-MCNC: 49 MG/DL (ref 55–121)
LDL CHOLESTEROL CALCULATED: 109 MG/DL
POTASSIUM SERPL-SCNC: 3.7 MMOL/L (ref 3.5–5)
PROSTATE SPECIFIC ANTIGEN: 4.36 NG/ML (ref 0–4)
SODIUM BLD-SCNC: 143 MMOL/L (ref 136–145)
TOTAL PROTEIN: 7.4 G/DL (ref 6.6–8.7)
TRIGL SERPL-MCNC: 106 MG/DL (ref 0–149)
TSH SERPL DL<=0.05 MIU/L-ACNC: 3.19 UIU/ML (ref 0.27–4.2)

## 2021-03-17 PROCEDURE — 36415 COLL VENOUS BLD VENIPUNCTURE: CPT | Performed by: FAMILY MEDICINE

## 2021-03-17 PROCEDURE — 3017F COLORECTAL CA SCREEN DOC REV: CPT | Performed by: FAMILY MEDICINE

## 2021-03-17 PROCEDURE — G8484 FLU IMMUNIZE NO ADMIN: HCPCS | Performed by: FAMILY MEDICINE

## 2021-03-17 PROCEDURE — G0439 PPPS, SUBSEQ VISIT: HCPCS | Performed by: FAMILY MEDICINE

## 2021-03-17 PROCEDURE — 1123F ACP DISCUSS/DSCN MKR DOCD: CPT | Performed by: FAMILY MEDICINE

## 2021-03-17 PROCEDURE — 1036F TOBACCO NON-USER: CPT | Performed by: FAMILY MEDICINE

## 2021-03-17 PROCEDURE — G8417 CALC BMI ABV UP PARAM F/U: HCPCS | Performed by: FAMILY MEDICINE

## 2021-03-17 PROCEDURE — 4040F PNEUMOC VAC/ADMIN/RCVD: CPT | Performed by: FAMILY MEDICINE

## 2021-03-17 PROCEDURE — 99214 OFFICE O/P EST MOD 30 MIN: CPT | Performed by: FAMILY MEDICINE

## 2021-03-17 PROCEDURE — G8427 DOCREV CUR MEDS BY ELIG CLIN: HCPCS | Performed by: FAMILY MEDICINE

## 2021-03-17 RX ORDER — METOPROLOL SUCCINATE 100 MG/1
100 TABLET, EXTENDED RELEASE ORAL DAILY
Qty: 90 TABLET | Refills: 3 | Status: SHIPPED | OUTPATIENT
Start: 2021-03-17 | End: 2022-05-04

## 2021-03-17 NOTE — PROGRESS NOTES
SUBJECTIVE:   Renita Ng is a 74 y.o. male presenting for his annual checkup. He actually came in for a Medicare annual wellness which was done but he has several problems we follow including hypertension and hyperlipidemia. He is concerned about his blood pressure. He thinks it is running a little higher than he would like. He denies headache chest pain or shortness of breath. He only eats 2 meals a day but says that works for him. He states his sleep pattern is odd. He goes to bed at midnight and gets up around 10 AM.  He wakes up at night. He only gets up 2 times to go to the bathroom at night but he feels like his mind is racing. This has been going on for several years. He rarely snores. He does not think that this is related to sleep apnea. His last colonoscopy was done at [facility] on 12/16/2015. He was told that he would need a 5-year recall. He denies any bright red blood per rectum or dark tarry stools. Wt Readings from Last 3 Encounters:   03/17/21 287 lb 9.6 oz (130.5 kg)   03/16/20 274 lb (124.3 kg)   01/09/20 272 lb (123.4 kg)   Unfortunately, he continues to gain weight.       Patient Active Problem List    Diagnosis Date Noted    Hyperlipidemia     Hypertension     Class 3 severe obesity without serious comorbidity with body mass index (BMI) of 40.0 to 44.9 in adult [facility])      Current Outpatient Medications   Medication Sig Dispense Refill    metoprolol succinate (TOPROL XL) 100 MG extended release tablet Take 1 tablet by mouth daily AT BEDTIME For heart and high blood pressure 90 tablet 3    pravastatin (PRAVACHOL) 40 MG tablet TAKE 2 TABLETS BY MOUTH NIGHTLY 180 tablet 3    tamsulosin (FLOMAX) 0.4 MG capsule TAKE 1 CAPSULE BY MOUTH EVERY DAY 90 capsule 1    indapamide (LOZOL) 1.25 MG tablet Take 1 tablet by mouth every morning For high blood pressure 90 tablet 3    amLODIPine-benazepril (LOTREL) 5-20 MG per capsule Take 1 capsule by mouth daily 90 capsule 3     No current facility-administered medications for this visit. Past Medical History:   Diagnosis Date    Borderline diabetes     Chronic back pain     Hyperlipidemia     Hypertension     Obesity      Past Surgical History:   Procedure Laterality Date    ACHILLES TENDON SURGERY      COLONOSCOPY  12/16/15    Dr Anam[facility] Mull[facility], normal    EYE SURGERY Bilateral     lens replaced    PRE-MALIGNANT / BENIGN SKIN LESION EXCISION  2017    RETINAL DETACHMENT SURGERY Left 10/14    RETINAL DETACHMENT SURGERY Left 07/2018    VASCULAR SURGERY  11/4/15 [facility] [address]    Aortorenal arteriogram with oblique views of the renal arteries. Family History   Problem Relation Age of Onset    Heart Disease Mother     High Blood Pressure Mother     Heart Disease Father     High Blood Pressure Father      Social History     Tobacco Use    Smoking status: Never Smoker    Smokeless tobacco: Never Used   Substance Use Topics    Alcohol use: No       Allergies: Patient has no known allergies. ROS:  Feeling well. No dyspnea or chest pain on exertion. No abdominal pain, change in bowel habits, black or bloody stools. No urinary tract or prostatic symptoms. No neurological complaints. All other systems negative. OBJECTIVE:   The patient appears well, alert, oriented x 3, in no distress. /82 (Site: Right Upper Arm, Position: Sitting, Cuff Size: Medium Adult)   Pulse 70   Temp 97.3 °F (36.3 °C)   Ht 5' 9\" (1.753 m)   Wt 287 lb 9.6 oz (130.5 kg)   SpO2 96%   BMI 42.47 kg/m²   Skin normal, no suspicious skin lesions. ENT normal.  Neck supple. No adenopathy or thyromegaly. SAMUEL. Lungs are clear, good air entry, no wheezes, rhonchi or rales. S1 and S2 normal, no murmurs, regular rate and rhythm. Abdomen is soft without tenderness, guarding, mass or organomegaly.   exam: PROSTATE EXAM: smooth and symmetric without nodules or tenderness, enlarged 1+, RECTAL EXAM: negative without mass, lesions or tenderness, no tenderness noted, sphincter tone normal, stool guaiac negative. Extremities show no edema, normal peripheral pulses. Neurological is normal without focal findings. Psychiatric exam, no signs of depression. ASSESSMENT:   1. Routine general medical examination at a health care facility    2. Class 3 severe obesity without serious comorbidity with body mass index (BMI) of 40.0 to 44.9 in adult, unspecified obesity type ([address]    3. Essential hypertension    4. Mixed hyperlipidemia    5. Screening for prostate cancer    6. Screen for colon cancer        PLAN:   Lifestyle advice: discussed diet and exercise  MEDICATIONS:  Orders Placed This Encounter   Medications    metoprolol succinate (TOPROL XL) 100 MG extended release tablet     Sig: Take 1 tablet by mouth daily AT BEDTIME For heart and high blood pressure     Dispense:  90 tablet     Refill:  3       ORDERS:  Orders Placed This Encounter   Procedures    Comprehensive Metabolic Panel    Lipid Panel    PSA screening    TSH without Reflex   Tavo Carey MD, Gastroenterology, [facility]     We will contact him when we get results of his blood work. He does need to follow-up for a colonoscopy. I did review his colonoscopy from [facility].  It is in media. We did discuss diet and exercise. If he has any problems he is to let me know. Follow-up:  Return in 1 year (on 3/17/2022) for Medicare Annual Wellness Visit in 1 year. PATIENT INSTRUCTIONS:  Patient Instructions       Personalized Preventive Plan for Kristie Mcfarland - 3/17/2021  Medicare offers a range of preventive health benefits. Some of the tests and screenings are paid in full while other may be subject to a deductible, co-insurance, and/or copay. Some of these benefits include a comprehensive review of your medical history including lifestyle, illnesses that may run in your family, and various assessments and screenings as appropriate.     After reviewing your medical record and screening and assessments performed today your provider may have ordered immunizations, labs, imaging, and/or referrals for you. A list of these orders (if applicable) as well as your Preventive Care list are included within your After Visit Summary for your review. Other Preventive Recommendations:    · A preventive eye exam performed by an eye specialist is recommended every 1-2 years to screen for glaucoma; cataracts, macular degeneration, and other eye disorders. · A preventive dental visit is recommended every 6 months. · Try to get at least 150 minutes of exercise per week or 10,000 steps per day on a pedometer . · Order or download the FREE \"Exercise & Physical Activity: Your Everyday Guide\" from [employer]. Call 8-946.470.2722 or search [employer] online. · You need 7736-2748 mg of calcium and 6433-6669 IU of vitamin D per day. It is possible to meet your calcium requirement with diet alone, but a vitamin D supplement is usually necessary to meet this goal.  · When exposed to the sun, use a sunscreen that protects against both UVA and UVB radiation with an SPF of 30 or greater. Reapply every 2 to 3 hours or after sweating, drying off with a towel, or swimming. · Always wear a seat belt when traveling in a car. Always wear a helmet when riding a bicycle or motorcycle. Heart-Healthy Diet   Sodium, Fat, and Cholesterol Controlled Diet       What Is a Heart Healthy Diet? A heart-healthy diet is one that limits sodium , certain types of fat , and cholesterol .  This type of diet is recommended for:   People with any form of cardiovascular disease (eg, coronary heart disease , peripheral vascular disease , previous heart attack , previous stroke )   People with risk factors for cardiovascular disease, such as high blood pressure , high cholesterol , or diabetes   Anyone who wants to lower their risk of developing cardiovascular disease   Sodium    Sodium is a mineral found in many foods. In general, most people consume much more sodium than they need. Diets high in sodium can increase blood pressure and lead to edema (water retention). On a heart-healthy diet, you should consume no more than 2,300 mg (milligrams) of sodium per dayabout the amount in one teaspoon of table salt. The foods highest in sodium include table salt (about 50% sodium), processed foods, convenience foods, and preserved foods. Cholesterol    Cholesterol is a fat-like, waxy substance in your blood. Our bodies make some cholesterol. It is also found in animal products, with the highest amounts in fatty meat, egg yolks, whole milk, cheese, shellfish, and organ meats. On a heart-healthy diet, you should limit your cholesterol intake to less than 200 mg per day. It is normal and important to have some cholesterol in your bloodstream. But too much cholesterol can cause plaque to build up within your arteries, which can eventually lead to a heart attack or stroke. The two types of cholesterol that are most commonly referred to are:   Low-density lipoprotein (LDL) cholesterol  Also known as bad cholesterol, this is the cholesterol that tends to build up along your arteries. Bad cholesterol levels are increased by eating fats that are saturated or hydrogenated. Optimal level of this cholesterol is less than 100. Over 130 starts to get risky for heart disease. High-density lipoprotein (HDL) cholesterol  Also known as good cholesterol, this type of cholesterol actually carries cholesterol away from your arteries and may, therefore, help lower your risk of having a heart attack. You want this level to be high (ideally greater than 60). It is a risk to have a level less than 40. You can raise this good cholesterol by eating olive oil, canola oil, avocados, or nuts. Exercise raises this level, too.    Fat    Fat is calorie dense and packs a lot of calories mono- and polyunsaturated fats.    Food Choices on a Heart Healthy Diet   Food Category   Foods Recommended   Foods to Avoid   Grains   Breads and rolls without salted tops Most dry and cooked cereals Unsalted crackers and breadsticks Low-sodium or homemade breadcrumbs or stuffing All rice and pastas   Breads, rolls, and crackers with salted tops High-fat baked goods (eg, muffins, donuts, pastries) Quick breads, self-rising flour, and biscuit mixes Regular bread crumbs Instant hot cereals Commercially prepared rice, pasta, or stuffing mixes   Vegetables   Most fresh, frozen, and low-sodium canned vegetables Low-sodium and salt-free vegetable juices Canned vegetables if unsalted or rinsed   Regular canned vegetables and juices, including sauerkraut and pickled vegetables Frozen vegetables with sauces Commercially prepared potato and vegetable mixes   Fruits   Most fresh, frozen, and canned fruits All fruit juices   Fruits processed with salt or sodium   Milk   Nonfat or low-fat (1%) milk Nonfat or low-fat yogurt Cottage cheese, low-fat ricotta, cheeses labeled as low-fat and low-sodium   Whole milk Reduced-fat (2%) milk Malted and chocolate milk Full fat yogurt Most cheeses (unless low-fat and low salt) Buttermilk (no more than 1 cup per week)   Meats and Beans   Lean cuts of fresh or frozen beef, veal, lamb, or pork (look for the word loin) Fresh or frozen poultry without the skin Fresh or frozen fish and some shellfish Egg whites and egg substitutes (Limit whole eggs to three per week) Tofu Nuts or seeds (unsalted, dry-roasted), low-sodium peanut butter Dried peas, beans, and lentils   Any smoked, cured, salted, or canned meat, fish, or poultry (including boyd, chipped beef, cold cuts, hot dogs, sausages, sardines, and anchovies) Poultry skins Breaded and/or fried fish or meats Canned peas, beans, and lentils Salted nuts   Fats and Oils   Olive oil and canola oil Low-sodium, low-fat salad dressings and mayonnaise Butter, margarine, coconut and palm oils, boyd fat   Snacks, Sweets, and Condiments   Low-sodium or unsalted versions of broths, soups, soy sauce, and condiments Pepper, herbs, and spices; vinegar, lemon, or lime juice Low-fat frozen desserts (yogurt, sherbet, fruit bars) Sugar, cocoa powder, honey, syrup, jam, and preserves Low-fat, trans-fat free cookies, cakes, and pies Noah and animal crackers, fig bars, jaja snaps   High-fat desserts Broth, soups, gravies, and sauces, made from instant mixes or other high-sodium ingredients Salted snack foods Canned olives Meat tenderizers, seasoning salt, and most flavored vinegars   Beverages   Low-sodium carbonated beverages Tea and coffee in moderation Soy milk   Commercially softened water   Suggestions   Make whole grains, fruits, and vegetables the base of your diet. Choose heart-healthy fats such as canola, olive, and flaxseed oil, and foods high in heart-healthy fats, such as nuts, seeds, soybeans, tofu, and fish. Eat fish at least twice per week; the fish highest in omega-3 fatty acids and lowest in mercury include salmon, herring, mackerel, sardines, and canned chunk light tuna. If you eat fish less than twice per week or have high triglycerides, talk to your doctor about taking fish oil supplements. Read food labels. For products low in fat and cholesterol, look for fat free, low-fat, cholesterol free, saturated fat free, and trans fat freeAlso scan the Nutrition Facts Label, which lists saturated fat, trans fat, and cholesterol amounts. For products low in sodium, look for sodium free, very low sodium, low sodium, no added salt, and unsalted   Skip the salt when cooking or at the table; if food needs more flavor, get creative and try out different herbs and spices. Garlic and onion also add substantial flavor to foods. Trim any visible fat off meat and poultry before cooking, and drain the fat off after savage.     Use cooking methods that require little or no added fat, such as grilling, boiling, baking, poaching, broiling, roasting, steaming, stir-frying, and sauting. Avoid fast food and convenience food. They tend to be high in saturated and trans fat and have a lot of added salt. Talk to a registered dietitian for individualized diet advice. Last Reviewed: March 2011 Kathy Barrientos MS, MPH, RD   Updated: 3/29/2011   ·   Patient Education        Insomnia: Care Instructions  Your Care Instructions     Insomnia is the inability to sleep well. It is a common problem for most people at some time. Insomnia may make it hard for you to get to sleep, stay asleep, or sleep as long as you need to. This can make you tired and grouchy during the day. It can also make you forgetful, less effective at work, and unhappy. Insomnia can be caused by conditions such as depression or anxiety. Pain can also affect your ability to sleep. When these problems are solved, the insomnia usually clears up. But sometimes bad sleep habits can cause insomnia. If insomnia is affecting your work or your enjoyment of life, you can take steps to improve your sleep. Follow-up care is a key part of your treatment and safety. Be sure to make and go to all appointments, and call your doctor if you are having problems. It's also a good idea to know your test results and keep a list of the medicines you take. How can you care for yourself at home? What to avoid   Do not have drinks with caffeine, such as coffee or black tea, for 8 hours before bed. Do not smoke or use other types of tobacco near bedtime. Nicotine is a stimulant and can keep you awake. Avoid drinking alcohol late in the evening, because it can cause you to wake in the middle of the night. Do not eat a big meal close to bedtime. If you are hungry, eat a light snack. Do not drink a lot of water close to bedtime, because the need to urinate may wake you up during the night. Do not read or watch TV in bed. Use the bed only for sleeping and sexual activity. What to try   Go to bed at the same time every night, and wake up at the same time every morning. Do not take naps during the day. Keep your bedroom quiet, dark, and cool. Sleep on a comfortable pillow and mattress. If watching the clock makes you anxious, turn it facing away from you so you cannot see the time. If you worry when you lie down, start a worry book. Well before bedtime, write down your worries, and then set the book and your concerns aside. Try meditation or other relaxation techniques before you go to bed. If you cannot fall asleep, get up and go to another room until you feel sleepy. Do something relaxing. Repeat your bedtime routine before you go to bed again. Make your house quiet and calm about an hour before bedtime. Turn down the lights, turn off the TV, log off the computer, and turn down the volume on music. This can help you relax after a busy day. When should you call for help? Watch closely for changes in your health, and be sure to contact your doctor if:    Your efforts to improve your sleep do not work.     Your insomnia gets worse.     You have been feeling down, depressed, or hopeless or have lost interest in things that you usually enjoy. Where can you learn more? Go to https://[employer]. org and sign in to your [employer] account. Enter P513 in the [facility] box to learn more about \"Insomnia: Care Instructions. \"     If you do not have an account, please click on the \"Sign Up Now\" link. Current as of: August 31, 2020               Content Version: 12.8  © 6028-7278 Healthwise, Incorporated. Care instructions adapted under license by [facility] ([facility]). If you have questions about a medical condition or this instruction, always ask your healthcare professional. [place] [address] any warranty or liability for your use of this information.          Patient Education        Learning About How to Prevent a Stroke  What is a stroke? A stroke occurs when a blood vessel in the brain bursts or is blocked by a blood clot. Without blood and the oxygen it carries, part of the brain starts to die. The part of the body controlled by the damaged area of the brain can't work properly. But there are many things you can do to help lower your stroke risk. What increases your risk for stroke? A risk factor is anything that makes you more likely to have a particular health problem. Risk factors for stroke that you can treat or change include:  Health problems like high blood pressure, atrial fibrillation, diabetes, and high cholesterol. Smoking. Heavy use of alcohol. Being overweight. Not getting enough physical activity. Risk factors you can't change include:  Age. The risk of stroke goes up as you get older. Race.  Americans, Native Americans, and [place] Natives have a higher risk than those of other races. Being female. Women have a higher risk of stroke than men. Family history of stroke. Your doctor can help you know your risk. Then you and your can doctor talk about whether you need to lower it. What can you do to prevent a stroke? Treat any health problems you have that raise your risk. Adopt a heart-healthy lifestyle:  Don't smoke. If you need help quitting, talk to your doctor about stop-smoking programs and medicines. These can increase your chances of quitting for good. Limit alcohol to 2 drinks a day for men and 1 drink a day for women. Stay at a healthy weight. Lose weight if you need to. If your doctor recommends it, get more exercise. Walking is a good choice. Bit by bit, increase the amount you walk every day. Try for at least 30 minutes on most days of the week. Eat heart-healthy foods. These include fruits, vegetables, high-fiber foods, and fish. Choose foods that are low in sodium, saturated fat, and trans fat.   Decide with your doctor whether you will also take medicines to help lower your risk. For example, you and your doctor may decide you will take a medicine that prevents blood clots. What are the symptoms of a stroke? Symptoms of a stroke happen quickly. A stroke may cause:  Sudden numbness, tingling, weakness, or loss of movement in your face, arm, or leg, especially on only one side of your body. Sudden vision changes. Sudden trouble speaking. Sudden confusion or trouble understanding simple statements. Sudden problems with walking or balance. A sudden, severe headache that is different from past headaches. FAST is a simple way to remember the main symptoms of stroke. Recognizing these symptoms helps you know when to call for medical help. FAST stands for:  F ace drooping. A rm weakness. S peech difficulty. T brock to call 911. It's important to call for medical help if you have stroke symptoms. Quick treatment may save your life. And it may reduce the damage in your brain so that you have fewer problems after the stroke. Follow-up care is a key part of your treatment and safety. Be sure to make and go to all appointments, and call your doctor if you are having problems. It's also a good idea to know your test results and keep a list of the medicines you take. Where can you learn more? Go to https://globalscholar.com.[employer]. org and sign in to your [employer] account. Enter G757 in the [facility] box to learn more about \"Learning About How to Prevent a Stroke. \"     If you do not have an account, please click on the \"Sign Up Now\" link. Current as of: March 4, 2020               Content Version: 12.8  © 9858-7861 Healthwise, Incorporated. Care instructions adapted under license by [facility] ([facility]). If you have questions about a medical condition or this instruction, always ask your healthcare professional. [place] [address] any warranty or liability for your use of this information.              EMR Dragon/transcription disclaimer:  Much of this encounter note is electronic transcription/translation of spoken language to printed texts. The electronic translation of spoken language may be erroneous, or at times, nonsensical words or phrases may be inadvertently transcribed.   Although I have reviewed the note for such errors, some may still exist.

## 2021-03-17 NOTE — PROGRESS NOTES
Medicare Annual Wellness Visit  Name: Oriana Daniels Date: 3/17/2021   MRN: [id number] Sex: Male   Age: 74 y.o. Ethnicity: Non-/Non    : 1955 Race: Nurys Hutchinson is here for Medicare AWV (Pt is here for his medicare annual wellness exam. Pt states he is having some trouble sleeping. Pt is fasting for labs today.)    Screenings for behavioral, psychosocial and functional/safety risks, and cognitive dysfunction are all negative except as indicated below. These results, as well as other patient data from the [address] form, are documented in Flowsheets linked to this Encounter. No Known Allergies    Prior to Visit Medications    Medication Sig Taking? Authorizing Provider   pravastatin (PRAVACHOL) 40 MG tablet TAKE 2 TABLETS BY MOUTH NIGHTLY Yes Tri Baca MD   tamsulosin (FLOMAX) 0.4 MG capsule TAKE 1 CAPSULE BY MOUTH EVERY DAY Yes Javi Tarango, APRN - CNP   indapamide (LOZOL) 1.25 MG tablet Take 1 tablet by mouth every morning For high blood pressure Yes Tri Baca MD   metoprolol succinate (TOPROL XL) 50 MG extended release tablet Take 1 tablet by mouth 2 times daily Yes Tri Baca MD   amLODIPine-benazepril (LOTREL) 5-20 MG per capsule Take 1 capsule by mouth daily Yes Tri Baca MD       Past Medical History:   Diagnosis Date    Borderline diabetes     Chronic back pain     Hyperlipidemia     Hypertension     Obesity        Past Surgical History:   Procedure Laterality Date    ACHILLES TENDON SURGERY      COLONOSCOPY  12/16/15    Dr Britta Henry at [facility], [place]   [address] Bilateral     lens replaced    PRE-MALIGNANT / BENIGN SKIN LESION EXCISION  2017    RETINAL DETACHMENT SURGERY Left 10/14    RETINAL DETACHMENT SURGERY Left 2018    VASCULAR SURGERY  11/4/15 [facility] & [address]    Aortorenal arteriogram with oblique views of the renal arteries.        Family History   Problem Relation Age of Onset    Heart Disease Mother     High Blood Pressure Mother     Heart Disease Father     High Blood Pressure Father        CareTeam (Including outside providers/suppliers regularly involved in providing care):   Patient Care Team:  Domingo Iglesias MD as PCP - General (Family Medicine)  Domingo Iglesias MD as PCP [facility]  Rik Pederson MD as Consulting Physician (Cardiology)    Wt Readings from Last 3 Encounters:   03/17/21 287 lb 9.6 oz (130.5 kg)   03/16/20 274 lb (124.3 kg)   01/09/20 272 lb (123.4 kg)     Vitals:    03/17/21 1021   BP: 120/82   Site: Right Upper Arm   Position: Sitting   Cuff Size: Medium Adult   Pulse: 70   Temp: 97.3 °F (36.3 °C)   SpO2: 96%   Weight: 287 lb 9.6 oz (130.5 kg)   Height: 5' 9\" (1.753 m)     Body mass index is 42.47 kg/m². Based upon direct observation of the patient, evaluation of cognition reveals recent and remote memory intact. Patient's complete Health Risk Assessment and screening values have been reviewed and are found in Flowsheets. The following problems were reviewed today and where indicated follow up appointments were made and/or referrals ordered. Positive Risk Factor Screenings with Interventions:          General Health and ACP:  General  In general, how would you say your health is?: Good  In the past 7 days, have you experienced any of the following? New or Increased Pain, New or Increased Fatigue, Loneliness, Social Isolation, Stress or Anger?: None of These  Do you get the social and emotional support that you need?: Yes  Do you have a Living Will?: Yes  Advance Directives     Power of [address] [place] [address] Will ACP-Advance Directive ACP-Power of     Not on File Not on File Not on File Not on File      General Health Risk Interventions:  · Not know why this fired. He answered all the questions appropriately.     Health Habits/Nutrition:  Health Habits/Nutrition  Do you exercise for at least 20 minutes 2-3 times per week?: (!) No  Have you lost any weight without trying in the past 3 months?: No  Do you eat only one meal per day?: No  Have you seen the dentist within the past year?: Yes  Body mass index: (!) 42.47  Health Habits/Nutrition Interventions:  · Inadequate physical activity:  Talked about increasing exercise.   · Nutritional issues:  educational materials for healthy, well-balanced diet provided    Hearing/Vision:  No exam data present  Hearing/Vision  Do you or your family notice any trouble with your hearing that hasn't been managed with hearing aids?: (!) Yes  Do you have difficulty driving, watching TV, or doing any of your daily activities because of your eyesight?: No  Have you had an eye exam within the past year?: Yes  Hearing/Vision Interventions:  · Hearing concerns:  Hearing worsens, patient is to call and I will refer him to the audiologist      Personalized Preventive Plan   Current Health Maintenance Status  Immunization History   Administered Date(s) Administered    COVID-19, Moderna, PF, 100mcg/0.5mL 03/16/2021    Pneumococcal Conjugate 13-valent ([place] Compa) 03/08/2018    Pneumococcal Polysaccharide ([id number]) 03/12/2019    Td, unspecified formulation 06/27/2011        Health Maintenance   Topic Date Due    Hepatitis C screen  Never done    Shingles Vaccine (1 of 2) Never done   [employer] Visit (AWV)  Never done    Flu vaccine (1) Never done    Colon cancer screen colonoscopy  12/16/2020    A1C test (Diabetic or Prediabetic)  01/09/2021    Lipid screen  01/09/2021    DTaP/Tdap/Td vaccine (1 - Tdap) 03/17/2022 (Originally 1/25/1974)    COVID-19 Vaccine (2 - Moderna 2-dose series) 04/13/2021    Potassium monitoring  04/16/2021    Creatinine monitoring  04/16/2021    Pneumococcal 65+ years Vaccine  Completed    Hepatitis A vaccine  Aged Out    Hepatitis B vaccine  Aged Out    Hib vaccine  Aged Out    Meningococcal (ACWY) vaccine  Aged Out     Recommendations for My [employer] Rewards Due: see orders and patient instructions/AVS.  . Recommended screening schedule for the next 5-10 years is provided to the patient in written form: see Patient Instructions/AVS.    There are no diagnoses linked to this encounter.

## 2021-03-17 NOTE — PATIENT INSTRUCTIONS
Personalized Preventive Plan for Toni Cardenas - 3/17/2021  Medicare offers a range of preventive health benefits. Some of the tests and screenings are paid in full while other may be subject to a deductible, co-insurance, and/or copay. Some of these benefits include a comprehensive review of your medical history including lifestyle, illnesses that may run in your family, and various assessments and screenings as appropriate. After reviewing your medical record and screening and assessments performed today your provider may have ordered immunizations, labs, imaging, and/or referrals for you. A list of these orders (if applicable) as well as your Preventive Care list are included within your After Visit Summary for your review. Other Preventive Recommendations:    · A preventive eye exam performed by an eye specialist is recommended every 1-2 years to screen for glaucoma; cataracts, macular degeneration, and other eye disorders. · A preventive dental visit is recommended every 6 months. · Try to get at least 150 minutes of exercise per week or 10,000 steps per day on a pedometer . · Order or download the FREE \"Exercise & Physical Activity: Your Everyday Guide\" from [employer]. Call 1-280.856.8951 or search [employer] online. · You need 6574-9978 mg of calcium and 7996-2575 IU of vitamin D per day. It is possible to meet your calcium requirement with diet alone, but a vitamin D supplement is usually necessary to meet this goal.  · When exposed to the sun, use a sunscreen that protects against both UVA and UVB radiation with an SPF of 30 or greater. Reapply every 2 to 3 hours or after sweating, drying off with a towel, or swimming. · Always wear a seat belt when traveling in a car. Always wear a helmet when riding a bicycle or motorcycle. Heart-Healthy Diet   Sodium, Fat, and Cholesterol Controlled Diet       What Is a Heart Healthy Diet?    A heart-healthy diet is one that limits sodium , certain types of fat , and cholesterol . This type of diet is recommended for:   People with any form of cardiovascular disease (eg, coronary heart disease , peripheral vascular disease , previous heart attack , previous stroke )   People with risk factors for cardiovascular disease, such as high blood pressure , high cholesterol , or diabetes   Anyone who wants to lower their risk of developing cardiovascular disease   Sodium    Sodium is a mineral found in many foods. In general, most people consume much more sodium than they need. Diets high in sodium can increase blood pressure and lead to edema (water retention). On a heart-healthy diet, you should consume no more than 2,300 mg (milligrams) of sodium per dayabout the amount in one teaspoon of table salt. The foods highest in sodium include table salt (about 50% sodium), processed foods, convenience foods, and preserved foods. Cholesterol    Cholesterol is a fat-like, waxy substance in your blood. Our bodies make some cholesterol. It is also found in animal products, with the highest amounts in fatty meat, egg yolks, whole milk, cheese, shellfish, and organ meats. On a heart-healthy diet, you should limit your cholesterol intake to less than 200 mg per day. It is normal and important to have some cholesterol in your bloodstream. But too much cholesterol can cause plaque to build up within your arteries, which can eventually lead to a heart attack or stroke. The two types of cholesterol that are most commonly referred to are:   Low-density lipoprotein (LDL) cholesterol  Also known as bad cholesterol, this is the cholesterol that tends to build up along your arteries. Bad cholesterol levels are increased by eating fats that are saturated or hydrogenated. Optimal level of this cholesterol is less than 100. Over 130 starts to get risky for heart disease.    High-density lipoprotein (HDL) cholesterol  Also known as good cholesterol, this type of cholesterol actually carries cholesterol away from your arteries and may, therefore, help lower your risk of having a heart attack. You want this level to be high (ideally greater than 60). It is a risk to have a level less than 40. You can raise this good cholesterol by eating olive oil, canola oil, avocados, or nuts. Exercise raises this level, too. Fat    Fat is calorie dense and packs a lot of calories into a small amount of food. Even though fats should be limited due to their high calorie content, not all fats are bad. In fact, some fats are quite healthful. Fat can be broken down into four main types. The good-for-you fats are:   Monounsaturated fat  found in oils such as olive and canola, avocados, and nuts and natural nut butters; can decrease cholesterol levels, while keeping levels of HDL cholesterol high   Polyunsaturated fat  found in oils such as safflower, sunflower, soybean, corn, and sesame; can decrease total cholesterol and LDL cholesterol   Omega-3 fatty acids  particularly those found in fatty fish (such as salmon, trout, tuna, mackerel, herring, and sardines); can decrease risk of arrhythmias, decrease triglyceride levels, and slightly lower blood pressure   The fats that you want to limit are:   Saturated fat  found in animal products, many fast foods, and a few vegetables; increases total blood cholesterol, including LDL levels   Animal fats that are saturated include: butter, lard, whole-milk dairy products, meat fat, and poultry skin   Vegetable fats that are saturated include: hydrogenated shortening, palm oil, coconut oil, cocoa butter   Hydrogenated or trans fat  found in margarine and vegetable shortening, most shelf stable snack foods, and fried foods; increases LDL and decreases HDL     It is generally recommended that you limit your total fat for the day to less than 30% of your total calories.  If you follow an 1800-calorie heart healthy diet, for example, this would mean 60 grams of fat or less per day. Saturated fat and trans fat in your diet raises your blood cholesterol the most, much more than dietary cholesterol does. For this reason, on a heart-healthy diet, less than 7% of your calories should come from saturated fat and ideally 0% from trans fat. On an 1800-calorie diet, this translates into less than 14 grams of saturated fat per day, leaving 46 grams of fat to come from mono- and polyunsaturated fats.    Food Choices on a Heart Healthy Diet   Food Category   Foods Recommended   Foods to Avoid   Grains   Breads and rolls without salted tops Most dry and cooked cereals Unsalted crackers and breadsticks Low-sodium or homemade breadcrumbs or stuffing All rice and pastas   Breads, rolls, and crackers with salted tops High-fat baked goods (eg, muffins, donuts, pastries) Quick breads, self-rising flour, and biscuit mixes Regular bread crumbs Instant hot cereals Commercially prepared rice, pasta, or stuffing mixes   Vegetables   Most fresh, frozen, and low-sodium canned vegetables Low-sodium and salt-free vegetable juices Canned vegetables if unsalted or rinsed   Regular canned vegetables and juices, including sauerkraut and pickled vegetables Frozen vegetables with sauces Commercially prepared potato and vegetable mixes   Fruits   Most fresh, frozen, and canned fruits All fruit juices   Fruits processed with salt or sodium   Milk   Nonfat or low-fat (1%) milk Nonfat or low-fat yogurt Cottage cheese, low-fat ricotta, cheeses labeled as low-fat and low-sodium   Whole milk Reduced-fat (2%) milk Malted and chocolate milk Full fat yogurt Most cheeses (unless low-fat and low salt) Buttermilk (no more than 1 cup per week)   Meats and Beans   Lean cuts of fresh or frozen beef, veal, lamb, or pork (look for the word loin) Fresh or frozen poultry without the skin Fresh or frozen fish and some shellfish Egg whites and egg substitutes (Limit whole eggs to three per and cholesterol amounts. For products low in sodium, look for sodium free, very low sodium, low sodium, no added salt, and unsalted   Skip the salt when cooking or at the table; if food needs more flavor, get creative and try out different herbs and spices. Garlic and onion also add substantial flavor to foods. Trim any visible fat off meat and poultry before cooking, and drain the fat off after savage. Use cooking methods that require little or no added fat, such as grilling, boiling, baking, poaching, broiling, roasting, steaming, stir-frying, and sauting. Avoid fast food and convenience food. They tend to be high in saturated and trans fat and have a lot of added salt. Talk to a registered dietitian for individualized diet advice. Last Reviewed: March 2011 Emeli Villasenor MS, MPH, RD   Updated: 3/29/2011   ·   Patient Education        Insomnia: Care Instructions  Your Care Instructions     Insomnia is the inability to sleep well. It is a common problem for most people at some time. Insomnia may make it hard for you to get to sleep, stay asleep, or sleep as long as you need to. This can make you tired and grouchy during the day. It can also make you forgetful, less effective at work, and unhappy. Insomnia can be caused by conditions such as depression or anxiety. Pain can also affect your ability to sleep. When these problems are solved, the insomnia usually clears up. But sometimes bad sleep habits can cause insomnia. If insomnia is affecting your work or your enjoyment of life, you can take steps to improve your sleep. Follow-up care is a key part of your treatment and safety. Be sure to make and go to all appointments, and call your doctor if you are having problems. It's also a good idea to know your test results and keep a list of the medicines you take. How can you care for yourself at home?   What to avoid   Do not have drinks with caffeine, such as coffee or black tea, for 8 hours before bed. Do not smoke or use other types of tobacco near bedtime. Nicotine is a stimulant and can keep you awake. Avoid drinking alcohol late in the evening, because it can cause you to wake in the middle of the night. Do not eat a big meal close to bedtime. If you are hungry, eat a light snack. Do not drink a lot of water close to bedtime, because the need to urinate may wake you up during the night. Do not read or watch TV in bed. Use the bed only for sleeping and sexual activity. What to try   Go to bed at the same time every night, and wake up at the same time every morning. Do not take naps during the day. Keep your bedroom quiet, dark, and cool. Sleep on a comfortable pillow and mattress. If watching the clock makes you anxious, turn it facing away from you so you cannot see the time. If you worry when you lie down, start a worry book. Well before bedtime, write down your worries, and then set the book and your concerns aside. Try meditation or other relaxation techniques before you go to bed. If you cannot fall asleep, get up and go to another room until you feel sleepy. Do something relaxing. Repeat your bedtime routine before you go to bed again. Make your house quiet and calm about an hour before bedtime. Turn down the lights, turn off the TV, log off the computer, and turn down the volume on music. This can help you relax after a busy day. When should you call for help? Watch closely for changes in your health, and be sure to contact your doctor if:    Your efforts to improve your sleep do not work.     Your insomnia gets worse.     You have been feeling down, depressed, or hopeless or have lost interest in things that you usually enjoy. Where can you learn more? Go to https://chblanco.[employer]. org and sign in to your [employer] account. Enter P513 in the [employer] box to learn more about \"Insomnia: Care Instructions. \"     If you do not have an account, please click on the \"Sign Up Now\" link. Current as of: August 31, 2020               Content Version: 12.8  © 2006-2021 [employer]. Care instructions adapted under license by [facility] ([facility]). If you have questions about a medical condition or this instruction, always ask your healthcare professional. [address] any warranty or liability for your use of this information. Patient Education        Learning About How to Prevent a Stroke  What is a stroke? A stroke occurs when a blood vessel in the brain bursts or is blocked by a blood clot. Without blood and the oxygen it carries, part of the brain starts to die. The part of the body controlled by the damaged area of the brain can't work properly. But there are many things you can do to help lower your stroke risk. What increases your risk for stroke? A risk factor is anything that makes you more likely to have a particular health problem. Risk factors for stroke that you can treat or change include:  Health problems like high blood pressure, atrial fibrillation, diabetes, and high cholesterol. Smoking. Heavy use of alcohol. Being overweight. Not getting enough physical activity. Risk factors you can't change include:  Age. The risk of stroke goes up as you get older. Race.  Americans, Native Americans, and [place] Natives have a higher risk than those of other races. Being female. Women have a higher risk of stroke than men. Family history of stroke. Your doctor can help you know your risk. Then you and your can doctor talk about whether you need to lower it. What can you do to prevent a stroke? Treat any health problems you have that raise your risk. Adopt a heart-healthy lifestyle:  Don't smoke. If you need help quitting, talk to your doctor about stop-smoking programs and medicines. These can increase your chances of quitting for good. Limit alcohol to 2 drinks a day for men and 1 drink a day for women. Stay at a healthy weight. Lose weight if you need to. If your doctor recommends it, get more exercise. Walking is a good choice. Bit by bit, increase the amount you walk every day. Try for at least 30 minutes on most days of the week. Eat heart-healthy foods. These include fruits, vegetables, high-fiber foods, and fish. Choose foods that are low in sodium, saturated fat, and trans fat. Decide with your doctor whether you will also take medicines to help lower your risk. For example, you and your doctor may decide you will take a medicine that prevents blood clots. What are the symptoms of a stroke? Symptoms of a stroke happen quickly. A stroke may cause:  Sudden numbness, tingling, weakness, or loss of movement in your face, arm, or leg, especially on only one side of your body. Sudden vision changes. Sudden trouble speaking. Sudden confusion or trouble understanding simple statements. Sudden problems with walking or balance. A sudden, severe headache that is different from past headaches. FAST is a simple way to remember the main symptoms of stroke. Recognizing these symptoms helps you know when to call for medical help. FAST stands for:  F ace drooping. A rm weakness. S peech difficulty. T brock to call 911. It's important to call for medical help if you have stroke symptoms. Quick treatment may save your life. And it may reduce the damage in your brain so that you have fewer problems after the stroke. Follow-up care is a key part of your treatment and safety. Be sure to make and go to all appointments, and call your doctor if you are having problems. It's also a good idea to know your test results and keep a list of the medicines you take. Where can you learn more? Go to https://thomas.[employer]. org and sign in to your [employer] account. Enter G757 in the viseto box to learn more about \"Learning About How to Prevent a Stroke. \"     If you do not have an account, please click on the \"Sign Up Now\" link. Current as of: March 4, 2020               Content Version: 12.8  © 2006-2021 [employer]. Care instructions adapted under license by [facility] ([facility]). If you have questions about a medical condition or this instruction, always ask your healthcare professional. [address] any warranty or liability for your use of this information.

## 2021-03-23 DIAGNOSIS — R97.20 ELEVATED PSA: Primary | ICD-10-CM

## 2021-04-01 ENCOUNTER — OFFICE VISIT (OUTPATIENT)
Dept: UROLOGY | Facility: CLINIC | Age: 66
End: 2021-04-01

## 2021-04-01 VITALS — BODY MASS INDEX: 42.36 KG/M2 | HEIGHT: 69 IN | WEIGHT: 286 LBS | TEMPERATURE: 98.6 F

## 2021-04-01 DIAGNOSIS — R97.20 ELEVATED PSA: Primary | ICD-10-CM

## 2021-04-01 PROCEDURE — 99204 OFFICE O/P NEW MOD 45 MIN: CPT | Performed by: UROLOGY

## 2021-04-01 RX ORDER — MAGNESIUM HYDROXIDE 1200 MG/15ML
LIQUID ORAL
Qty: 1 ENEMA | Refills: 0 | Status: SHIPPED | OUTPATIENT
Start: 2021-04-01

## 2021-04-01 RX ORDER — LEVOFLOXACIN 500 MG/1
TABLET, FILM COATED ORAL
Qty: 1 TABLET | Refills: 0 | Status: SHIPPED | OUTPATIENT
Start: 2021-04-01

## 2021-04-01 RX ORDER — METOPROLOL SUCCINATE 50 MG/1
50 TABLET, EXTENDED RELEASE ORAL DAILY
COMMUNITY
Start: 2021-03-06

## 2021-04-01 RX ORDER — AMLODIPINE BESYLATE AND BENAZEPRIL HYDROCHLORIDE 5; 20 MG/1; MG/1
1 CAPSULE ORAL
COMMUNITY
Start: 2020-12-17

## 2021-04-01 RX ORDER — PRAVASTATIN SODIUM 40 MG
80 TABLET ORAL NIGHTLY
COMMUNITY
Start: 2021-03-08 | End: 2022-04-14

## 2021-04-01 RX ORDER — TAMSULOSIN HYDROCHLORIDE 0.4 MG/1
CAPSULE ORAL
COMMUNITY
Start: 2021-01-11 | End: 2022-04-14 | Stop reason: SDUPTHER

## 2021-04-01 NOTE — PATIENT INSTRUCTIONS
"BMI for Adults  What is BMI?  Body mass index (BMI) is a number that is calculated from a person's weight and height. BMI can help estimate how much of a person's weight is composed of fat. BMI does not measure body fat directly. Rather, it is an alternative to procedures that directly measure body fat, which can be difficult and expensive.  BMI can help identify people who may be at higher risk for certain medical problems.  What are BMI measurements used for?  BMI is used as a screening tool to identify possible weight problems. It helps determine whether a person is obese, overweight, a healthy weight, or underweight.  BMI is useful for:  · Identifying a weight problem that may be related to a medical condition or may increase the risk for medical problems.  · Promoting changes, such as changes in diet and exercise, to help reach a healthy weight. BMI screening can be repeated to see if these changes are working.  How is BMI calculated?  BMI involves measuring your weight in relation to your height. Both height and weight are measured, and the BMI is calculated from those numbers. This can be done either in English (U.S.) or metric measurements. Note that charts and online BMI calculators are available to help you find your BMI quickly and easily without having to do these calculations yourself.  To calculate your BMI in English (U.S.) measurements:    1. Measure your weight in pounds (lb).  2. Multiply the number of pounds by 703.  ? For example, for a person who weighs 180 lb, multiply that number by 703, which equals 126,540.  3. Measure your height in inches. Then multiply that number by itself to get a measurement called \"inches squared.\"  ? For example, for a person who is 70 inches tall, the \"inches squared\" measurement is 70 inches x 70 inches, which equals 4,900 inches squared.  4. Divide the total from step 2 (number of lb x 703) by the total from step 3 (inches squared): 126,540 ÷ 4,900 = 25.8. This is " "your BMI.  To calculate your BMI in metric measurements:  1. Measure your weight in kilograms (kg).  2. Measure your height in meters (m). Then multiply that number by itself to get a measurement called \"meters squared.\"  ? For example, for a person who is 1.75 m tall, the \"meters squared\" measurement is 1.75 m x 1.75 m, which is equal to 3.1 meters squared.  3. Divide the number of kilograms (your weight) by the meters squared number. In this example: 70 ÷ 3.1 = 22.6. This is your BMI.  What do the results mean?  BMI charts are used to identify whether you are underweight, normal weight, overweight, or obese. The following guidelines will be used:  · Underweight: BMI less than 18.5.  · Normal weight: BMI between 18.5 and 24.9.  · Overweight: BMI between 25 and 29.9.  · Obese: BMI of 30 or above.  Keep these notes in mind:  · Weight includes both fat and muscle, so someone with a muscular build, such as an athlete, may have a BMI that is higher than 24.9. In cases like these, BMI is not an accurate measure of body fat.  · To determine if excess body fat is the cause of a BMI of 25 or higher, further assessments may need to be done by a health care provider.  · BMI is usually interpreted in the same way for men and women.  Where to find more information  For more information about BMI, including tools to quickly calculate your BMI, go to these websites:  · Centers for Disease Control and Prevention: www.cdc.gov  · American Heart Association: www.heart.org  · National Heart, Lung, and Blood [place]: www.nhlbi.nih.gov  Summary  · Body mass index (BMI) is a number that is calculated from a person's weight and height.  · BMI may help estimate how much of a person's weight is composed of fat. BMI can help identify those who may be at higher risk for certain medical problems.  · BMI can be measured using English measurements or metric measurements.  · BMI charts are used to identify whether you are underweight, normal " weight, overweight, or obese.  This information is not intended to replace advice given to you by your health care provider. Make sure you discuss any questions you have with your health care provider.  Document Revised: 09/09/2020 Document Reviewed: 07/17/2020  Elsevier Patient Education © 2021 Elsevier Inc.

## 2021-04-13 RX ORDER — PRAVASTATIN SODIUM 80 MG/1
TABLET ORAL
Qty: 90 TABLET | Refills: 1 | Status: SHIPPED | OUTPATIENT
Start: 2021-04-13 | End: 2021-10-18

## 2021-04-15 ENCOUNTER — PROCEDURE VISIT (OUTPATIENT)
Dept: UROLOGY | Facility: CLINIC | Age: 66
End: 2021-04-15

## 2021-04-15 DIAGNOSIS — R97.20 ELEVATED PSA: Primary | ICD-10-CM

## 2021-04-15 LAB
BILIRUB BLD-MCNC: NEGATIVE MG/DL
CLARITY, POC: CLEAR
COLOR UR: YELLOW
GLUCOSE UR STRIP-MCNC: NEGATIVE MG/DL
KETONES UR QL: NEGATIVE
LEUKOCYTE EST, POC: NEGATIVE
NITRITE UR-MCNC: NEGATIVE MG/ML
PH UR: 5.5 [PH] (ref 5–8)
PROT UR STRIP-MCNC: NEGATIVE MG/DL
RBC # UR STRIP: NEGATIVE /UL
SP GR UR: 1.02 (ref 1–1.03)
UROBILINOGEN UR QL: ABNORMAL

## 2021-04-15 PROCEDURE — 88342 IMHCHEM/IMCYTCHM 1ST ANTB: CPT | Performed by: UROLOGY

## 2021-04-15 PROCEDURE — 55700 PR PROSTATE NEEDLE BIOPSY ANY APPROACH: CPT | Performed by: UROLOGY

## 2021-04-15 PROCEDURE — 96372 THER/PROPH/DIAG INJ SC/IM: CPT | Performed by: UROLOGY

## 2021-04-15 PROCEDURE — 76942 ECHO GUIDE FOR BIOPSY: CPT | Performed by: UROLOGY

## 2021-04-15 PROCEDURE — G0416 PROSTATE BIOPSY, ANY MTHD: HCPCS | Performed by: UROLOGY

## 2021-04-15 PROCEDURE — 88341 IMHCHEM/IMCYTCHM EA ADD ANTB: CPT | Performed by: UROLOGY

## 2021-04-15 PROCEDURE — 81003 URINALYSIS AUTO W/O SCOPE: CPT | Performed by: UROLOGY

## 2021-04-15 NOTE — PROGRESS NOTES
CC: I am here for my prostate biopsy    TRUS PROSTATE WITH BIOPSY PROCEDURE NOTE  Indications:  Elevated PSA    Pre-operative prep:  fleets enema and oral antibiotic      Procedure:    After proper identification of patient and procedure, patient was placed in the left later decubitus position.  2% lidocaine jelly was instilled per rectum  for topical anesthesia.  The ultrasound probe was gently inserted per rectum. Prostate was scanned from the base of the bladder to the apex.  20 cc of 1% lidocaine plain was then used to perform a prostate nerve block injecting the junction of the seminal vesicle and bladder laterally.      Prostate length: 51.1 mm    Prostate width: 50.3 mm    Prostate height: 36.0 mm    Prostate volume: 48.4 cc    PSA density: 0.09    Abnormal findings:  No lesions noted and Transition zone enlargement    Median lobe:  no    A total of 12 biopsies were taken from the base, apex, and mid portion of the gland on both the right and the left sides.     Patient tolerated the procedure well    Complications: none    Estimated blood loss: minimal    Mr. Melo was given instructions for follow up.  He will notify the office if he has excessive hematuria, hematochezia, fevers, perineal, or abdominal pain.    Follow up:   He will follow up in 1 week  for pathology results    Diagnoses and all orders for this visit:    1. Elevated PSA (Primary)  -     POC Urinalysis Dipstick, Multipro  -     Tissue Pathology Exam; Future  -     Tissue Pathology Exam        Assessment/Plan

## 2021-04-15 NOTE — PATIENT INSTRUCTIONS
"BMI for Adults  What is BMI?  Body mass index (BMI) is a number that is calculated from a person's weight and height. BMI can help estimate how much of a person's weight is composed of fat. BMI does not measure body fat directly. Rather, it is an alternative to procedures that directly measure body fat, which can be difficult and expensive.  BMI can help identify people who may be at higher risk for certain medical problems.  What are BMI measurements used for?  BMI is used as a screening tool to identify possible weight problems. It helps determine whether a person is obese, overweight, a healthy weight, or underweight.  BMI is useful for:  · Identifying a weight problem that may be related to a medical condition or may increase the risk for medical problems.  · Promoting changes, such as changes in diet and exercise, to help reach a healthy weight. BMI screening can be repeated to see if these changes are working.  How is BMI calculated?  BMI involves measuring your weight in relation to your height. Both height and weight are measured, and the BMI is calculated from those numbers. This can be done either in English (U.S.) or metric measurements. Note that charts and online BMI calculators are available to help you find your BMI quickly and easily without having to do these calculations yourself.  To calculate your BMI in English (U.S.) measurements:    1. Measure your weight in pounds (lb).  2. Multiply the number of pounds by 703.  ? For example, for a person who weighs 180 lb, multiply that number by 703, which equals 126,540.  3. Measure your height in inches. Then multiply that number by itself to get a measurement called \"inches squared.\"  ? For example, for a person who is 70 inches tall, the \"inches squared\" measurement is 70 inches x 70 inches, which equals 4,900 inches squared.  4. Divide the total from step 2 (number of lb x 703) by the total from step 3 (inches squared): 126,540 ÷ 4,900 = 25.8. This is " "your BMI.  To calculate your BMI in metric measurements:  1. Measure your weight in kilograms (kg).  2. Measure your height in meters (m). Then multiply that number by itself to get a measurement called \"meters squared.\"  ? For example, for a person who is 1.75 m tall, the \"meters squared\" measurement is 1.75 m x 1.75 m, which is equal to 3.1 meters squared.  3. Divide the number of kilograms (your weight) by the meters squared number. In this example: 70 ÷ 3.1 = 22.6. This is your BMI.  What do the results mean?  BMI charts are used to identify whether you are underweight, normal weight, overweight, or obese. The following guidelines will be used:  · Underweight: BMI less than 18.5.  · Normal weight: BMI between 18.5 and 24.9.  · Overweight: BMI between 25 and 29.9.  · Obese: BMI of 30 or above.  Keep these notes in mind:  · Weight includes both fat and muscle, so someone with a muscular build, such as an athlete, may have a BMI that is higher than 24.9. In cases like these, BMI is not an accurate measure of body fat.  · To determine if excess body fat is the cause of a BMI of 25 or higher, further assessments may need to be done by a health care provider.  · BMI is usually interpreted in the same way for men and women.  Where to find more information  For more information about BMI, including tools to quickly calculate your BMI, go to these websites:  · Centers for Disease Control and Prevention: www.cdc.gov  · American Heart Association: www.heart.org  · National Heart, Lung, and Blood [place]: www.nhlbi.nih.gov  Summary  · Body mass index (BMI) is a number that is calculated from a person's weight and height.  · BMI may help estimate how much of a person's weight is composed of fat. BMI can help identify those who may be at higher risk for certain medical problems.  · BMI can be measured using English measurements or metric measurements.  · BMI charts are used to identify whether you are underweight, normal " weight, overweight, or obese.  This information is not intended to replace advice given to you by your health care provider. Make sure you discuss any questions you have with your health care provider.  Document Revised: 09/09/2020 Document Reviewed: 07/17/2020  Elsevier Patient Education © 2021 Elsevier Inc.

## 2021-04-19 LAB
CYTO UR: NORMAL
LAB AP CASE REPORT: NORMAL
PATH REPORT.FINAL DX SPEC: NORMAL
PATH REPORT.GROSS SPEC: NORMAL

## 2021-04-20 ENCOUNTER — TELEPHONE (OUTPATIENT)
Dept: UROLOGY | Facility: CLINIC | Age: 66
End: 2021-04-20

## 2021-04-20 DIAGNOSIS — R97.20 ELEVATED PSA: Primary | ICD-10-CM

## 2021-04-20 NOTE — TELEPHONE ENCOUNTER
I called and informed the patient that his biopsy showed no cancer.  We discussed that this does not completely rule out the presence of cancer and I recommended to follow-up with me in 6 months in my [facility] in October with preclinic PSA.  He voiced understanding and agreement.

## 2021-05-20 ENCOUNTER — TELEPHONE (OUTPATIENT)
Dept: NEUROSURGERY | Age: 66
End: 2021-05-20

## 2021-05-20 ENCOUNTER — OFFICE VISIT (OUTPATIENT)
Dept: PRIMARY CARE CLINIC | Age: 66
End: 2021-05-20
Payer: MEDICARE

## 2021-05-20 VITALS
DIASTOLIC BLOOD PRESSURE: 80 MMHG | HEART RATE: 66 BPM | OXYGEN SATURATION: 98 % | WEIGHT: 288 LBS | BODY MASS INDEX: 42.65 KG/M2 | TEMPERATURE: 97.4 F | HEIGHT: 69 IN | SYSTOLIC BLOOD PRESSURE: 160 MMHG

## 2021-05-20 DIAGNOSIS — R29.898 TRANSIENT RIGHT LEG WEAKNESS: Primary | ICD-10-CM

## 2021-05-20 DIAGNOSIS — R29.898 TRANSIENT RIGHT LEG WEAKNESS: ICD-10-CM

## 2021-05-20 DIAGNOSIS — I10 ESSENTIAL HYPERTENSION: ICD-10-CM

## 2021-05-20 DIAGNOSIS — M54.41 ACUTE RIGHT-SIDED LOW BACK PAIN WITH RIGHT-SIDED SCIATICA: Primary | ICD-10-CM

## 2021-05-20 DIAGNOSIS — M54.41 ACUTE RIGHT-SIDED LOW BACK PAIN WITH RIGHT-SIDED SCIATICA: ICD-10-CM

## 2021-05-20 PROCEDURE — 4040F PNEUMOC VAC/ADMIN/RCVD: CPT | Performed by: NURSE PRACTITIONER

## 2021-05-20 PROCEDURE — 3017F COLORECTAL CA SCREEN DOC REV: CPT | Performed by: NURSE PRACTITIONER

## 2021-05-20 PROCEDURE — G8417 CALC BMI ABV UP PARAM F/U: HCPCS | Performed by: NURSE PRACTITIONER

## 2021-05-20 PROCEDURE — 1036F TOBACCO NON-USER: CPT | Performed by: NURSE PRACTITIONER

## 2021-05-20 PROCEDURE — G8427 DOCREV CUR MEDS BY ELIG CLIN: HCPCS | Performed by: NURSE PRACTITIONER

## 2021-05-20 PROCEDURE — 99214 OFFICE O/P EST MOD 30 MIN: CPT | Performed by: NURSE PRACTITIONER

## 2021-05-20 PROCEDURE — 1123F ACP DISCUSS/DSCN MKR DOCD: CPT | Performed by: NURSE PRACTITIONER

## 2021-05-20 RX ORDER — IBUPROFEN 800 MG/1
800 TABLET ORAL 3 TIMES DAILY PRN
Qty: 90 TABLET | Refills: 0 | Status: ON HOLD | OUTPATIENT
Start: 2021-05-20 | End: 2021-07-27 | Stop reason: SDUPTHER

## 2021-05-20 RX ORDER — TIZANIDINE 4 MG/1
4 TABLET ORAL 3 TIMES DAILY PRN
Qty: 30 TABLET | Refills: 0 | Status: SHIPPED | OUTPATIENT
Start: 2021-05-20 | End: 2021-06-08

## 2021-05-20 RX ORDER — PREDNISONE 10 MG/1
TABLET ORAL
Qty: 18 TABLET | Refills: 0 | Status: ON HOLD | OUTPATIENT
Start: 2021-05-20 | End: 2021-07-27 | Stop reason: HOSPADM

## 2021-05-20 RX ORDER — MELOXICAM 15 MG/1
15 TABLET ORAL DAILY
COMMUNITY
End: 2021-05-20 | Stop reason: ALTCHOICE

## 2021-05-20 RX ORDER — HYDROCODONE BITARTRATE AND ACETAMINOPHEN 5; 325 MG/1; MG/1
1 TABLET ORAL EVERY 6 HOURS PRN
Qty: 12 TABLET | Refills: 0 | Status: SHIPPED | OUTPATIENT
Start: 2021-05-20 | End: 2021-05-23

## 2021-05-20 ASSESSMENT — ENCOUNTER SYMPTOMS
EYES NEGATIVE: 1
RESPIRATORY NEGATIVE: 1
GASTROINTESTINAL NEGATIVE: 1
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
## 2021-05-20 NOTE — PATIENT INSTRUCTIONS

## 2021-05-20 NOTE — TELEPHONE ENCOUNTER
[facility] New Patient Questionnaire    1. Diagnosis/Reason for Referral?    Severe low back pain     2. Who is completing questionnaire? Dr. Romy Burns office      Patient  Caregiver Family      3. Has the patient had any previous spinal/brain surgeries?no        A. If yes, what is the name of the facility in which the surgery was performed? B. Procedure/Surgery performed? C. Who was the surgeon? D. When was the surgery? MM/YY       E. Did the patient improve after the surgery? 4. Is this a second opinion? If yes, Dr. Robel Loyola would like to review patient first before making the appointment. 5. Have MRI Images been obtain within the last year? Yes x No      XR x CT     If yes, where was the imaging performed? [facility]     If yes, what part of the body? Lumbar x Cervical  Thoracic  Brain     If yes, when was it obtained? MM/YY    Note: if the scan was performed at a facility other than [facility], the disc will need to be brought to the appointment or we need to reach out to obtain the disc. A. Was the patient instructed to provide the disc? Yes x No      8. Has the patient had a NCV/EMG within the last year? Yes  [facility]     If yes, where was it performed and date? MM/YY  Location:      9. Has the patient been to Physical Therapy? Yes  [facility]     If yes, what location, how long attended, and last visit? Location:        Therapy Lasted:    Date of Last Visit:      10. Has the patient been to Pain Management? Yes  [facility]     If yes, what location and last visit     Location:   Last Visit:   Is it helping?

## 2021-05-21 ENCOUNTER — HOSPITAL ENCOUNTER (OUTPATIENT)
Dept: MRI IMAGING | Age: 66
Discharge: HOME | End: 2021-05-21
Payer: MEDICARE

## 2021-05-21 DIAGNOSIS — M54.41 ACUTE RIGHT-SIDED LOW BACK PAIN WITH RIGHT-SIDED SCIATICA: ICD-10-CM

## 2021-05-21 DIAGNOSIS — R29.898 TRANSIENT RIGHT LEG WEAKNESS: ICD-10-CM

## 2021-05-21 LAB
GFR AFRICAN AMERICAN: 56
GFR NON-AFRICAN AMERICAN: 46
PERFORMED ON: ABNORMAL
POC CREATININE: 1.5 MG/DL (ref 0.3–1.3)
POC SAMPLE TYPE: ABNORMAL

## 2021-05-21 PROCEDURE — 82565 ASSAY OF CREATININE: CPT

## 2021-05-21 PROCEDURE — 72158 MRI LUMBAR SPINE W/O & W/DYE: CPT

## 2021-05-24 ENCOUNTER — OFFICE VISIT (OUTPATIENT)
Dept: NEUROSURGERY | Age: 66
End: 2021-05-24
Payer: MEDICARE

## 2021-05-24 VITALS
DIASTOLIC BLOOD PRESSURE: 73 MMHG | HEIGHT: 69 IN | BODY MASS INDEX: 42.65 KG/M2 | WEIGHT: 288 LBS | HEART RATE: 40 BPM | SYSTOLIC BLOOD PRESSURE: 133 MMHG

## 2021-05-24 DIAGNOSIS — M47.816 LUMBAR SPONDYLOSIS: ICD-10-CM

## 2021-05-24 DIAGNOSIS — M54.16 LUMBAR RADICULOPATHY, ACUTE: Primary | ICD-10-CM

## 2021-05-24 PROCEDURE — G8417 CALC BMI ABV UP PARAM F/U: HCPCS | Performed by: NEUROLOGICAL SURGERY

## 2021-05-24 PROCEDURE — 1123F ACP DISCUSS/DSCN MKR DOCD: CPT | Performed by: NEUROLOGICAL SURGERY

## 2021-05-24 PROCEDURE — 3017F COLORECTAL CA SCREEN DOC REV: CPT | Performed by: NEUROLOGICAL SURGERY

## 2021-05-24 PROCEDURE — 99204 OFFICE O/P NEW MOD 45 MIN: CPT | Performed by: NEUROLOGICAL SURGERY

## 2021-05-24 PROCEDURE — 4040F PNEUMOC VAC/ADMIN/RCVD: CPT | Performed by: NEUROLOGICAL SURGERY

## 2021-05-24 PROCEDURE — G8427 DOCREV CUR MEDS BY ELIG CLIN: HCPCS | Performed by: NEUROLOGICAL SURGERY

## 2021-05-24 PROCEDURE — 1036F TOBACCO NON-USER: CPT | Performed by: NEUROLOGICAL SURGERY
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
## 2021-06-07 ENCOUNTER — OFFICE VISIT (OUTPATIENT)
Dept: NEUROSURGERY | Age: 66
End: 2021-06-07
Payer: MEDICARE

## 2021-06-07 VITALS
BODY MASS INDEX: 41.47 KG/M2 | DIASTOLIC BLOOD PRESSURE: 86 MMHG | HEIGHT: 69 IN | SYSTOLIC BLOOD PRESSURE: 151 MMHG | WEIGHT: 280 LBS | HEART RATE: 64 BPM

## 2021-06-07 DIAGNOSIS — M47.816 LUMBAR SPONDYLOSIS: Primary | ICD-10-CM

## 2021-06-07 DIAGNOSIS — M51.16 LUMBAR DISC HERNIATION WITH RADICULOPATHY: ICD-10-CM

## 2021-06-07 PROCEDURE — G8427 DOCREV CUR MEDS BY ELIG CLIN: HCPCS | Performed by: NEUROLOGICAL SURGERY

## 2021-06-07 PROCEDURE — 3017F COLORECTAL CA SCREEN DOC REV: CPT | Performed by: NEUROLOGICAL SURGERY

## 2021-06-07 PROCEDURE — G8417 CALC BMI ABV UP PARAM F/U: HCPCS | Performed by: NEUROLOGICAL SURGERY

## 2021-06-07 PROCEDURE — 1123F ACP DISCUSS/DSCN MKR DOCD: CPT | Performed by: NEUROLOGICAL SURGERY

## 2021-06-07 PROCEDURE — 1036F TOBACCO NON-USER: CPT | Performed by: NEUROLOGICAL SURGERY

## 2021-06-07 PROCEDURE — 4040F PNEUMOC VAC/ADMIN/RCVD: CPT | Performed by: NEUROLOGICAL SURGERY

## 2021-06-07 PROCEDURE — 99213 OFFICE O/P EST LOW 20 MIN: CPT | Performed by: NEUROLOGICAL SURGERY
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
## 2021-07-15 ENCOUNTER — TELEPHONE (OUTPATIENT)
Dept: NEUROSURGERY | Age: 66
End: 2021-07-15

## 2021-07-19 ENCOUNTER — OFFICE VISIT (OUTPATIENT)
Dept: NEUROSURGERY | Age: 66
End: 2021-07-19
Payer: MEDICARE

## 2021-07-19 VITALS
DIASTOLIC BLOOD PRESSURE: 89 MMHG | HEIGHT: 69 IN | HEART RATE: 63 BPM | BODY MASS INDEX: 40.73 KG/M2 | SYSTOLIC BLOOD PRESSURE: 146 MMHG | WEIGHT: 275 LBS

## 2021-07-19 DIAGNOSIS — M51.16 LUMBAR DISC HERNIATION WITH RADICULOPATHY: Primary | ICD-10-CM

## 2021-07-19 PROCEDURE — 99215 OFFICE O/P EST HI 40 MIN: CPT | Performed by: NEUROLOGICAL SURGERY

## 2021-07-19 PROCEDURE — 4040F PNEUMOC VAC/ADMIN/RCVD: CPT | Performed by: NEUROLOGICAL SURGERY

## 2021-07-19 PROCEDURE — 1036F TOBACCO NON-USER: CPT | Performed by: NEUROLOGICAL SURGERY

## 2021-07-19 PROCEDURE — 1123F ACP DISCUSS/DSCN MKR DOCD: CPT | Performed by: NEUROLOGICAL SURGERY

## 2021-07-19 PROCEDURE — G8427 DOCREV CUR MEDS BY ELIG CLIN: HCPCS | Performed by: NEUROLOGICAL SURGERY

## 2021-07-19 PROCEDURE — G8417 CALC BMI ABV UP PARAM F/U: HCPCS | Performed by: NEUROLOGICAL SURGERY

## 2021-07-19 PROCEDURE — 3017F COLORECTAL CA SCREEN DOC REV: CPT | Performed by: NEUROLOGICAL SURGERY

## 2021-07-19 NOTE — H&P (VIEW-ONLY)
[id number] [facility]  History and Physical        Chief Complaint   Patient presents with    Follow-up     back pain. HISTORY OF PRESENT ILLNESS:      The patient is a 74 y.o. male returns for follow up after completing physical therapy for a right-sided inferior L3/4 disc herniation causing right leg pain (his MRI does reveal a prominent S1/2 disc for labelling purposes). He states his symptoms are unchanged. He continues to endorse back pain radiating mostly into his right buttock, anterior and lateral thigh. When the pain is severe, he also complains of painful paresthesias in his lateral calf and foot. He notes that his right leg will give out on him when he is walking long distances or descending stairs. He denies any difficulty with bowel or bladder control. MEDICAL HISTORY:             Past Medical History:   Diagnosis Date    Borderline diabetes     Chronic back pain     Hyperlipidemia     Hypertension     Obesity        Past Surgical History:   Procedure Laterality Date    ACHILLES TENDON SURGERY      COLONOSCOPY  12/16/15    [facility], normal    EYE SURGERY Bilateral     lens replaced    PRE-MALIGNANT / BENIGN SKIN LESION EXCISION  2017    RETINAL DETACHMENT SURGERY Left 10/14    RETINAL DETACHMENT SURGERY Left 07/2018    VASCULAR SURGERY  11/4/15 [facility] & [address]    Aortorenal arteriogram with oblique views of the renal arteries. Current Outpatient Medications:     tiZANidine (ZANAFLEX) 4 MG tablet, TAKE 1 TABLET BY MOUTH 3 TIMES DAILY AS NEEDED (MUSCLE SPASM), Disp: 30 tablet, Rfl: 1    ibuprofen (ADVIL;MOTRIN) 800 MG tablet, Take 1 tablet by mouth 3 times daily as needed for Pain, Disp: 90 tablet, Rfl: 0    predniSONE (DELTASONE) 10 MG tablet, Days 1-3 take 1 PO TID, days 4-6 take 1 PO BID, days 7-9 take 1 PO daily. , Disp: 18 tablet, Rfl: 0    pravastatin (PRAVACHOL) 80 MG tablet, Take 1 tablet by mouth in the evening for high cholesterol, Disp: 90 tablet, Rfl: 1    metoprolol succinate (TOPROL XL) 100 MG extended release tablet, Take 1 tablet by mouth daily AT BEDTIME For heart and high blood pressure, Disp: 90 tablet, Rfl: 3    tamsulosin (FLOMAX) 0.4 MG capsule, TAKE 1 CAPSULE BY MOUTH EVERY DAY, Disp: 90 capsule, Rfl: 1    indapamide (LOZOL) 1.25 MG tablet, Take 1 tablet by mouth every morning For high blood pressure, Disp: 90 tablet, Rfl: 3    amLODIPine-benazepril (LOTREL) 5-20 MG per capsule, Take 1 capsule by mouth daily, Disp: 90 capsule, Rfl: 3    Allergies:  Patient has no known allergies. Social History:   Social History     Tobacco Use   Smoking Status Never Smoker   Smokeless Tobacco Never Used     Social History     Substance and Sexual Activity   Alcohol Use No         Family History:   Family History   Problem Relation Age of Onset    Heart Disease Mother     High Blood Pressure Mother     Heart Disease Father     High Blood Pressure Father        REVIEW OF SYSTEMS:    Constitutional: Negative. HENT: Negative. Eyes: Negative. Respiratory: Negative. Cardiovascular: Negative. Gastrointestinal: Negative. Genitourinary: Negative. Musculoskeletal: Positive for back pain. Skin: Negative. Neurological: Negative. Endo/Heme/Allergies: Negative. Psychiatric/Behavioral: Negative. Review of Systems was obtained by the medical assistant and reviewed by myself. PHYSICAL EXAM:    Vitals:    07/19/21 1148   BP: (!) 146/89   Pulse: 63       Constitutional: Appears well-developed and well-nourished.    Eyes  conjunctiva normal.  Pupils react to light  Ear, nose,throat -Normal pinna and tragus, No scars, or lesions over external nose or ears, no obvious atrophy of tongue  Neck- symmetric, trachea midline, no jugular vein distension  Respiration- chest wall symmetric, normal effort without use of accessory muscles  Musculoskeletal  no significant wasting of muscles noted, no bony deformities, symmetric bulk  Extremities- no clubbing, cyanosis or edema  Skin  warm, dry, and intact. No rash,erythema, or pallor. Psychiatric  mood, affect, and behavior appear normal.       NEUROLOGIC EXAM:    MENTAL STATUS: Alert, oriented, thought content appropriate    CRANIAL NERVES: PERRL, EOMI, symmetric facies, tongue midline    MOTOR:     Right Upper Extremity:    Deltoid: 5/5  Biceps: 5/5  Triceps: 5/5  Wrist Extension: 5/5  Finger Abduction: 5/5    Left Upper Extremity:    Deltoid: 5/5  Biceps: 5/5  Triceps: 5/5  Wrist Extension: 5/5  Finger Abduction: 5/5    Right Lower Extremity:    Hip Flexion: 5/5  Knee Extension: 5/5  Ankle Plantarflexion: 5/5  Ankle Dorsiflexion: 5/5      Left Lower Extremity:    Hip Flexion: 5/5  Knee Extension: 5/5  Ankle Plantarflexion: 5/5  Ankle Dorsiflexion: 5/5      SOMATOSENSORY:     Right Upper Extremity: normal light touch sensation  Left Upper Extremity: normal light touch sensation  Right Lower Extremity: Decreased to light touch in the lateral and anterior thigh and lateral calf  Left Lower Extremity: normal light touch sensation      REFLEXES:    Biceps: 2+  Patella: 2+    Chang's: Negative      COORDINATION and GAIT:    Gait: mildly antalgic          ASSESSMENT AND PLAN:  This is a 74 y.o. male  who presents with ongoing back pain and right leg pain with an inferiorly-extruded right-sided L3/4 disc herniation. He has attempted multiple modalities of non-surgical treatment including NSAIDs, steroids and physical therapy and his pain persists. We discussed is ongoing symptoms and I offered him surgery (a right L3/4 minimally-invasive microdiscectomy). The procedure was explained to him in-detail, including risks, benefits and alternatives. Specific risks of bleeding, infection, neurologic injury, CSF leak, incomplete symptom relief and recurrent disc herniation were all discussed. He voiced understanding and requested that we proceed with surgery.   He does have a prominent S1/2 disc space that will be an important for identifying the proper level at the time of surgery.             Shareen Hodgkin, MD

## 2021-07-19 NOTE — H&P
[facility] Neurosurgery  History and Physical        Chief Complaint   Patient presents with    Follow-up     back pain. HISTORY OF PRESENT ILLNESS:      The patient is a 74 y.o. male returns for follow up after completing physical therapy for a right-sided inferior L3/4 disc herniation causing right leg pain (his MRI does reveal a prominent S1/2 disc for labelling purposes). He states his symptoms are unchanged. He continues to endorse back pain radiating mostly into his right buttock, anterior and lateral thigh. When the pain is severe, he also complains of painful paresthesias in his lateral calf and foot. He notes that his right leg will give out on him when he is walking long distances or descending stairs. He denies any difficulty with bowel or bladder control. MEDICAL HISTORY:             Past Medical History:   Diagnosis Date    Borderline diabetes     Chronic back pain     Hyperlipidemia     Hypertension     Obesity        Past Surgical History:   Procedure Laterality Date    ACHILLES TENDON SURGERY      COLONOSCOPY  12/16/15    Dr Roberta Ochoa at [facility], normal    EYE SURGERY Bilateral     lens replaced    PRE-MALIGNANT / BENIGN SKIN LESION EXCISION  2017    RETINAL DETACHMENT SURGERY Left 10/14    RETINAL DETACHMENT SURGERY Left 07/2018    VASCULAR SURGERY  11/4/15 [facility] & [address]    Aortorenal arteriogram with oblique views of the renal arteries. Current Outpatient Medications:     tiZANidine (ZANAFLEX) 4 MG tablet, TAKE 1 TABLET BY MOUTH 3 TIMES DAILY AS NEEDED (MUSCLE SPASM), Disp: 30 tablet, Rfl: 1    ibuprofen (ADVIL;MOTRIN) 800 MG tablet, Take 1 tablet by mouth 3 times daily as needed for Pain, Disp: 90 tablet, Rfl: 0    predniSONE (DELTASONE) 10 MG tablet, Days 1-3 take 1 PO TID, days 4-6 take 1 PO BID, days 7-9 take 1 PO daily. , Disp: 18 tablet, Rfl: 0    pravastatin (PRAVACHOL) 80 MG tablet, Take 1 tablet by mouth in the evening for high cholesterol, Disp: 90 tablet, Rfl: 1    metoprolol succinate (TOPROL XL) 100 MG extended release tablet, Take 1 tablet by mouth daily AT BEDTIME For heart and high blood pressure, Disp: 90 tablet, Rfl: 3    tamsulosin (FLOMAX) 0.4 MG capsule, TAKE 1 CAPSULE BY MOUTH EVERY DAY, Disp: 90 capsule, Rfl: 1    indapamide (LOZOL) 1.25 MG tablet, Take 1 tablet by mouth every morning For high blood pressure, Disp: 90 tablet, Rfl: 3    amLODIPine-benazepril (LOTREL) 5-20 MG per capsule, Take 1 capsule by mouth daily, Disp: 90 capsule, Rfl: 3    Allergies:  Patient has no known allergies. Social History:   Social History     Tobacco Use   Smoking Status Never Smoker   Smokeless Tobacco Never Used     Social History     Substance and Sexual Activity   Alcohol Use No         Family History:   Family History   Problem Relation Age of Onset    Heart Disease Mother     High Blood Pressure Mother     Heart Disease Father     High Blood Pressure Father        REVIEW OF SYSTEMS:    Constitutional: Negative. HENT: Negative. Eyes: Negative. Respiratory: Negative. Cardiovascular: Negative. Gastrointestinal: Negative. Genitourinary: Negative. Musculoskeletal: Positive for back pain. Skin: Negative. Neurological: Negative. Endo/Heme/Allergies: Negative. Psychiatric/Behavioral: Negative. Review of Systems was obtained by the medical assistant and reviewed by myself. PHYSICAL EXAM:    Vitals:    07/19/21 1148   BP: (!) 146/89   Pulse: 63       Constitutional: Appears well-developed and well-nourished.    Eyes  conjunctiva normal.  Pupils react to light  Ear, nose,throat -Normal pinna and tragus, No scars, or lesions over external nose or ears, no obvious atrophy of tongue  Neck- symmetric, trachea midline, no jugular vein distension  Respiration- chest wall symmetric, normal effort without use of accessory muscles  Musculoskeletal  no significant wasting of muscles noted, no bony deformities, symmetric bulk  Extremities- no clubbing, cyanosis or edema  Skin  warm, dry, and intact. No rash,erythema, or pallor. Psychiatric  mood, affect, and behavior appear normal.       NEUROLOGIC EXAM:    MENTAL STATUS: Alert, oriented, thought content appropriate    CRANIAL NERVES: PERRL, EOMI, symmetric facies, tongue midline    MOTOR:     Right Upper Extremity:    Deltoid: 5/5  Biceps: 5/5  Triceps: 5/5  Wrist Extension: 5/5  Finger Abduction: 5/5    Left Upper Extremity:    Deltoid: 5/5  Biceps: 5/5  Triceps: 5/5  Wrist Extension: 5/5  Finger Abduction: 5/5    Right Lower Extremity:    Hip Flexion: 5/5  Knee Extension: 5/5  Ankle Plantarflexion: 5/5  Ankle Dorsiflexion: 5/5      Left Lower Extremity:    Hip Flexion: 5/5  Knee Extension: 5/5  Ankle Plantarflexion: 5/5  Ankle Dorsiflexion: 5/5      SOMATOSENSORY:     Right Upper Extremity: normal light touch sensation  Left Upper Extremity: normal light touch sensation  Right Lower Extremity: Decreased to light touch in the lateral and anterior thigh and lateral calf  Left Lower Extremity: normal light touch sensation      REFLEXES:    Biceps: 2+  Patella: 2+    Chang's: Negative      COORDINATION and GAIT:    Gait: mildly antalgic          ASSESSMENT AND PLAN:  This is a 74 y.o. male  who presents with ongoing back pain and right leg pain with an inferiorly-extruded right-sided L3/4 disc herniation. He has attempted multiple modalities of non-surgical treatment including NSAIDs, steroids and physical therapy and his pain persists. We discussed is ongoing symptoms and I offered him surgery (a right L3/4 minimally-invasive microdiscectomy). The procedure was explained to him in-detail, including risks, benefits and alternatives. Specific risks of bleeding, infection, neurologic injury, CSF leak, incomplete symptom relief and recurrent disc herniation were all discussed. He voiced understanding and requested that we proceed with surgery.   He does have a prominent S1/2 disc space that will be

## 2021-07-19 NOTE — PROGRESS NOTES
[facility] Neurosurgery  History and Physical        Chief Complaint   Patient presents with    Follow-up     back pain. HISTORY OF PRESENT ILLNESS:      The patient is a 74 y.o. male returns for follow up after completing physical therapy for a right-sided inferior L3/4 disc herniation causing right leg pain (his MRI does reveal a prominent S1/2 disc for labelling purposes). He states his symptoms are unchanged. He continues to endorse back pain radiating mostly into his right buttock, anterior and lateral thigh. When the pain is severe, he also complains of painful paresthesias in his lateral calf and foot. He notes that his right leg will give out on him when he is walking long distances or descending stairs. He denies any difficulty with bowel or bladder control. MEDICAL HISTORY:             Past Medical History:   Diagnosis Date    Borderline diabetes     Chronic back pain     Hyperlipidemia     Hypertension     Obesity        Past Surgical History:   Procedure Laterality Date    ACHILLES TENDON SURGERY      COLONOSCOPY  12/16/15    [facility], normal    EYE SURGERY Bilateral     lens replaced    PRE-MALIGNANT / BENIGN SKIN LESION EXCISION  2017    RETINAL DETACHMENT SURGERY Left 10/14    RETINAL DETACHMENT SURGERY Left 07/2018    VASCULAR SURGERY  11/4/15 [facility] & [address]    Aortorenal arteriogram with oblique views of the renal arteries. Current Outpatient Medications:     tiZANidine (ZANAFLEX) 4 MG tablet, TAKE 1 TABLET BY MOUTH 3 TIMES DAILY AS NEEDED (MUSCLE SPASM), Disp: 30 tablet, Rfl: 1    ibuprofen (ADVIL;MOTRIN) 800 MG tablet, Take 1 tablet by mouth 3 times daily as needed for Pain, Disp: 90 tablet, Rfl: 0    predniSONE (DELTASONE) 10 MG tablet, Days 1-3 take 1 PO TID, days 4-6 take 1 PO BID, days 7-9 take 1 PO daily. , Disp: 18 tablet, Rfl: 0    pravastatin (PRAVACHOL) 80 MG tablet, Take 1 tablet by mouth in the evening for high cholesterol, Disp: 90 tablet, Rfl: 1    metoprolol succinate (TOPROL XL) 100 MG extended release tablet, Take 1 tablet by mouth daily AT BEDTIME For heart and high blood pressure, Disp: 90 tablet, Rfl: 3    tamsulosin (FLOMAX) 0.4 MG capsule, TAKE 1 CAPSULE BY MOUTH EVERY DAY, Disp: 90 capsule, Rfl: 1    indapamide (LOZOL) 1.25 MG tablet, Take 1 tablet by mouth every morning For high blood pressure, Disp: 90 tablet, Rfl: 3    amLODIPine-benazepril (LOTREL) 5-20 MG per capsule, Take 1 capsule by mouth daily, Disp: 90 capsule, Rfl: 3    Allergies:  Patient has no known allergies. Social History:   Social History     Tobacco Use   Smoking Status Never Smoker   Smokeless Tobacco Never Used     Social History     Substance and Sexual Activity   Alcohol Use No         Family History:   Family History   Problem Relation Age of Onset    Heart Disease Mother     High Blood Pressure Mother     Heart Disease Father     High Blood Pressure Father        REVIEW OF SYSTEMS:    Constitutional: Negative. HENT: Negative. Eyes: Negative. Respiratory: Negative. Cardiovascular: Negative. Gastrointestinal: Negative. Genitourinary: Negative. Musculoskeletal: Positive for back pain. Skin: Negative. Neurological: Negative. Endo/Heme/Allergies: Negative. Psychiatric/Behavioral: Negative. Review of Systems was obtained by the medical assistant and reviewed by myself. PHYSICAL EXAM:    Vitals:    07/19/21 1148   BP: (!) 146/89   Pulse: 63       Constitutional: Appears well-developed and well-nourished.    Eyes  conjunctiva normal.  Pupils react to light  Ear, nose,throat -Normal pinna and tragus, No scars, or lesions over external nose or ears, no obvious atrophy of tongue  Neck- symmetric, trachea midline, no jugular vein distension  Respiration- chest wall symmetric, normal effort without use of accessory muscles  Musculoskeletal  no significant wasting of muscles noted, no bony deformities, symmetric bulk  Extremities- no clubbing, cyanosis or edema  Skin  warm, dry, and intact. No rash,erythema, or pallor. Psychiatric  mood, affect, and behavior appear normal.       NEUROLOGIC EXAM:    MENTAL STATUS: Alert, oriented, thought content appropriate    CRANIAL NERVES: PERRL, EOMI, symmetric facies, tongue midline    MOTOR:     Right Upper Extremity:    Deltoid: 5/5  Biceps: 5/5  Triceps: 5/5  Wrist Extension: 5/5  Finger Abduction: 5/5    Left Upper Extremity:    Deltoid: 5/5  Biceps: 5/5  Triceps: 5/5  Wrist Extension: 5/5  Finger Abduction: 5/5    Right Lower Extremity:    Hip Flexion: 5/5  Knee Extension: 5/5  Ankle Plantarflexion: 5/5  Ankle Dorsiflexion: 5/5      Left Lower Extremity:    Hip Flexion: 5/5  Knee Extension: 5/5  Ankle Plantarflexion: 5/5  Ankle Dorsiflexion: 5/5      SOMATOSENSORY:     Right Upper Extremity: normal light touch sensation  Left Upper Extremity: normal light touch sensation  Right Lower Extremity: Decreased to light touch in the lateral and anterior thigh and lateral calf  Left Lower Extremity: normal light touch sensation      REFLEXES:    Biceps: 2+  Patella: 2+    Chang's: Negative      COORDINATION and GAIT:    Gait: mildly antalgic          ASSESSMENT AND PLAN:  This is a 74 y.o. male  who presents with ongoing back pain and right leg pain with an inferiorly-extruded right-sided L3/4 disc herniation. He has attempted multiple modalities of non-surgical treatment including NSAIDs, steroids and physical therapy and his pain persists. We discussed is ongoing symptoms and I offered him surgery (a right L3/4 minimally-invasive microdiscectomy). The procedure was explained to him in-detail, including risks, benefits and alternatives. Specific risks of bleeding, infection, neurologic injury, CSF leak, incomplete symptom relief and recurrent disc herniation were all discussed. He voiced understanding and requested that we proceed with surgery.   He does have a prominent S1/2 disc space that will be an important for identifying the proper level at the time of surgery.             James Ricks MD

## 2021-07-20 ENCOUNTER — TELEPHONE (OUTPATIENT)
Dept: NEUROSURGERY | Age: 66
End: 2021-07-20

## 2021-07-23 ENCOUNTER — HOSPITAL ENCOUNTER (OUTPATIENT)
Dept: PREADMISSION TESTING | Age: 66
Discharge: HOME | End: 2021-07-27
Payer: MEDICARE

## 2021-07-23 VITALS — WEIGHT: 275 LBS | BODY MASS INDEX: 40.73 KG/M2 | HEIGHT: 69 IN

## 2021-07-23 LAB
ABO/RH: NORMAL
ANION GAP SERPL CALCULATED.3IONS-SCNC: 12 MMOL/L (ref 7–19)
ANTIBODY SCREEN: NORMAL
BASOPHILS ABSOLUTE: 0 K/UL (ref 0–0.2)
BASOPHILS RELATIVE PERCENT: 0.6 % (ref 0–1)
BUN BLDV-MCNC: 31 MG/DL (ref 8–23)
CALCIUM SERPL-MCNC: 9.7 MG/DL (ref 8.8–10.2)
CHLORIDE BLD-SCNC: 98 MMOL/L (ref 98–111)
CO2: 31 MMOL/L (ref 22–29)
CREAT SERPL-MCNC: 1.3 MG/DL (ref 0.5–1.2)
EOSINOPHILS ABSOLUTE: 0.1 K/UL (ref 0–0.6)
EOSINOPHILS RELATIVE PERCENT: 1.4 % (ref 0–5)
GFR AFRICAN AMERICAN: >59
GFR NON-AFRICAN AMERICAN: 55
GLUCOSE BLD-MCNC: 166 MG/DL (ref 74–109)
HCT VFR BLD CALC: 44.2 % (ref 42–52)
HEMOGLOBIN: 15.4 G/DL (ref 14–18)
IMMATURE GRANULOCYTES #: 0 K/UL
LYMPHOCYTES ABSOLUTE: 2.2 K/UL (ref 1.1–4.5)
LYMPHOCYTES RELATIVE PERCENT: 34.9 % (ref 20–40)
MCH RBC QN AUTO: 32.2 PG (ref 27–31)
MCHC RBC AUTO-ENTMCNC: 34.8 G/DL (ref 33–37)
MCV RBC AUTO: 92.3 FL (ref 80–94)
MONOCYTES ABSOLUTE: 0.6 K/UL (ref 0–0.9)
MONOCYTES RELATIVE PERCENT: 8.6 % (ref 0–10)
NEUTROPHILS ABSOLUTE: 3.5 K/UL (ref 1.5–7.5)
NEUTROPHILS RELATIVE PERCENT: 54 % (ref 50–65)
PDW BLD-RTO: 13.2 % (ref 11.5–14.5)
PLATELET # BLD: 207 K/UL (ref 130–400)
PMV BLD AUTO: 10.9 FL (ref 9.4–12.4)
POTASSIUM SERPL-SCNC: 3.6 MMOL/L (ref 3.5–5)
RBC # BLD: 4.79 M/UL (ref 4.7–6.1)
SODIUM BLD-SCNC: 141 MMOL/L (ref 136–145)
WBC # BLD: 6.4 K/UL (ref 4.8–10.8)

## 2021-07-23 PROCEDURE — 80048 BASIC METABOLIC PNL TOTAL CA: CPT

## 2021-07-23 PROCEDURE — 86850 RBC ANTIBODY SCREEN: CPT

## 2021-07-23 PROCEDURE — 86901 BLOOD TYPING SEROLOGIC RH(D): CPT

## 2021-07-23 PROCEDURE — 93005 ELECTROCARDIOGRAM TRACING: CPT | Performed by: ANESTHESIOLOGY

## 2021-07-23 PROCEDURE — 86900 BLOOD TYPING SEROLOGIC ABO: CPT

## 2021-07-23 PROCEDURE — 85025 COMPLETE CBC W/AUTO DIFF WBC: CPT

## 2021-07-26 LAB
EKG P AXIS: 42 DEGREES
EKG P-R INTERVAL: 232 MS
EKG Q-T INTERVAL: 448 MS
EKG QRS DURATION: 90 MS
EKG QTC CALCULATION (BAZETT): 436 MS
EKG T AXIS: 83 DEGREES

## 2021-07-27 ENCOUNTER — ANESTHESIA (OUTPATIENT)
Dept: OPERATING ROOM | Age: 66
End: 2021-07-27
Payer: MEDICARE

## 2021-07-27 ENCOUNTER — HOSPITAL ENCOUNTER (OUTPATIENT)
Age: 66
Setting detail: OUTPATIENT SURGERY
Discharge: HOME | End: 2021-07-27
Attending: NEUROLOGICAL SURGERY | Admitting: NEUROLOGICAL SURGERY
Payer: MEDICARE

## 2021-07-27 ENCOUNTER — ANESTHESIA EVENT (OUTPATIENT)
Dept: OPERATING ROOM | Age: 66
End: 2021-07-27
Payer: MEDICARE

## 2021-07-27 ENCOUNTER — APPOINTMENT (OUTPATIENT)
Dept: GENERAL RADIOLOGY | Age: 66
End: 2021-07-27
Attending: NEUROLOGICAL SURGERY
Payer: MEDICARE

## 2021-07-27 VITALS
DIASTOLIC BLOOD PRESSURE: 70 MMHG | OXYGEN SATURATION: 91 % | SYSTOLIC BLOOD PRESSURE: 129 MMHG | HEART RATE: 57 BPM | WEIGHT: 275 LBS | HEIGHT: 69 IN | RESPIRATION RATE: 16 BRPM | TEMPERATURE: 97.5 F | BODY MASS INDEX: 40.73 KG/M2

## 2021-07-27 VITALS — DIASTOLIC BLOOD PRESSURE: 68 MMHG | TEMPERATURE: 95.7 F | OXYGEN SATURATION: 97 % | SYSTOLIC BLOOD PRESSURE: 110 MMHG

## 2021-07-27 DIAGNOSIS — M47.816 LUMBAR SPONDYLOSIS: Primary | ICD-10-CM

## 2021-07-27 DIAGNOSIS — M51.16 LUMBAR DISC HERNIATION WITH RADICULOPATHY: ICD-10-CM

## 2021-07-27 PROCEDURE — 3600000014 HC SURGERY LEVEL 4 ADDTL 15MIN: Performed by: NEUROLOGICAL SURGERY

## 2021-07-27 PROCEDURE — 3209999900 FLUORO FOR SURGICAL PROCEDURES

## 2021-07-27 PROCEDURE — 7100000010 HC PHASE II RECOVERY - FIRST 15 MIN: Performed by: NEUROLOGICAL SURGERY

## 2021-07-27 PROCEDURE — 6370000000 HC RX 637 (ALT 250 FOR IP): Performed by: NEUROLOGICAL SURGERY

## 2021-07-27 PROCEDURE — 7100000000 HC PACU RECOVERY - FIRST 15 MIN: Performed by: NEUROLOGICAL SURGERY

## 2021-07-27 PROCEDURE — 2500000003 HC RX 250 WO HCPCS: Performed by: NEUROLOGICAL SURGERY

## 2021-07-27 PROCEDURE — 63030 LAMOT DCMPRN NRV RT 1 LMBR: CPT | Performed by: NEUROLOGICAL SURGERY

## 2021-07-27 PROCEDURE — 2709999900 HC NON-CHARGEABLE SUPPLY: Performed by: NEUROLOGICAL SURGERY

## 2021-07-27 PROCEDURE — 6360000002 HC RX W HCPCS: Performed by: ANESTHESIOLOGY

## 2021-07-27 PROCEDURE — 2580000003 HC RX 258: Performed by: NEUROLOGICAL SURGERY

## 2021-07-27 PROCEDURE — 3700000001 HC ADD 15 MINUTES (ANESTHESIA): Performed by: NEUROLOGICAL SURGERY

## 2021-07-27 PROCEDURE — 2500000003 HC RX 250 WO HCPCS

## 2021-07-27 PROCEDURE — 3700000000 HC ANESTHESIA ATTENDED CARE: Performed by: NEUROLOGICAL SURGERY

## 2021-07-27 PROCEDURE — 7100000001 HC PACU RECOVERY - ADDTL 15 MIN: Performed by: NEUROLOGICAL SURGERY

## 2021-07-27 PROCEDURE — 2580000003 HC RX 258: Performed by: ANESTHESIOLOGY

## 2021-07-27 PROCEDURE — 2780000010 HC IMPLANT OTHER: Performed by: NEUROLOGICAL SURGERY

## 2021-07-27 PROCEDURE — 2500000003 HC RX 250 WO HCPCS: Performed by: ANESTHESIOLOGY

## 2021-07-27 PROCEDURE — 7100000011 HC PHASE II RECOVERY - ADDTL 15 MIN: Performed by: NEUROLOGICAL SURGERY

## 2021-07-27 PROCEDURE — 72100 X-RAY EXAM L-S SPINE 2/3 VWS: CPT

## 2021-07-27 PROCEDURE — 2720000010 HC SURG SUPPLY STERILE: Performed by: NEUROLOGICAL SURGERY

## 2021-07-27 PROCEDURE — 3600000004 HC SURGERY LEVEL 4 BASE: Performed by: NEUROLOGICAL SURGERY

## 2021-07-27 PROCEDURE — 6360000002 HC RX W HCPCS: Performed by: NEUROLOGICAL SURGERY

## 2021-07-27 PROCEDURE — 6360000002 HC RX W HCPCS

## 2021-07-27 PROCEDURE — 2580000003 HC RX 258

## 2021-07-27 RX ORDER — SODIUM CHLORIDE, SODIUM LACTATE, POTASSIUM CHLORIDE, CALCIUM CHLORIDE 600; 310; 30; 20 MG/100ML; MG/100ML; MG/100ML; MG/100ML
INJECTION, SOLUTION INTRAVENOUS CONTINUOUS PRN
Status: DISCONTINUED | OUTPATIENT
Start: 2021-07-27 | End: 2021-07-27 | Stop reason: SDUPTHER

## 2021-07-27 RX ORDER — ROCURONIUM BROMIDE 10 MG/ML
INJECTION, SOLUTION INTRAVENOUS PRN
Status: DISCONTINUED | OUTPATIENT
Start: 2021-07-27 | End: 2021-07-27 | Stop reason: SDUPTHER

## 2021-07-27 RX ORDER — HYDROMORPHONE HYDROCHLORIDE 1 MG/ML
0.5 INJECTION, SOLUTION INTRAMUSCULAR; INTRAVENOUS; SUBCUTANEOUS EVERY 5 MIN PRN
Status: DISCONTINUED | OUTPATIENT
Start: 2021-07-27 | End: 2021-07-27 | Stop reason: HOSPADM

## 2021-07-27 RX ORDER — DIPHENHYDRAMINE HYDROCHLORIDE 50 MG/ML
12.5 INJECTION INTRAMUSCULAR; INTRAVENOUS
Status: DISCONTINUED | OUTPATIENT
Start: 2021-07-27 | End: 2021-07-27 | Stop reason: HOSPADM

## 2021-07-27 RX ORDER — SODIUM CHLORIDE 0.9 % (FLUSH) 0.9 %
10 SYRINGE (ML) INJECTION PRN
Status: DISCONTINUED | OUTPATIENT
Start: 2021-07-27 | End: 2021-07-27 | Stop reason: HOSPADM

## 2021-07-27 RX ORDER — LIDOCAINE HYDROCHLORIDE 10 MG/ML
INJECTION, SOLUTION EPIDURAL; INFILTRATION; INTRACAUDAL; PERINEURAL PRN
Status: DISCONTINUED | OUTPATIENT
Start: 2021-07-27 | End: 2021-07-27 | Stop reason: SDUPTHER

## 2021-07-27 RX ORDER — HYDROMORPHONE HYDROCHLORIDE 1 MG/ML
0.25 INJECTION, SOLUTION INTRAMUSCULAR; INTRAVENOUS; SUBCUTANEOUS EVERY 5 MIN PRN
Status: DISCONTINUED | OUTPATIENT
Start: 2021-07-27 | End: 2021-07-27 | Stop reason: HOSPADM

## 2021-07-27 RX ORDER — SUCCINYLCHOLINE/SOD CL,ISO/PF 100 MG/5ML
SYRINGE (ML) INTRAVENOUS PRN
Status: DISCONTINUED | OUTPATIENT
Start: 2021-07-27 | End: 2021-07-27 | Stop reason: SDUPTHER

## 2021-07-27 RX ORDER — EPHEDRINE SULFATE 50 MG/ML
INJECTION, SOLUTION INTRAVENOUS PRN
Status: DISCONTINUED | OUTPATIENT
Start: 2021-07-27 | End: 2021-07-27 | Stop reason: SDUPTHER

## 2021-07-27 RX ORDER — ENALAPRILAT 2.5 MG/2ML
1.25 INJECTION INTRAVENOUS
Status: DISCONTINUED | OUTPATIENT
Start: 2021-07-27 | End: 2021-07-27 | Stop reason: HOSPADM

## 2021-07-27 RX ORDER — ONDANSETRON 2 MG/ML
4 INJECTION INTRAMUSCULAR; INTRAVENOUS
Status: DISCONTINUED | OUTPATIENT
Start: 2021-07-27 | End: 2021-07-27 | Stop reason: HOSPADM

## 2021-07-27 RX ORDER — IBUPROFEN 800 MG/1
800 TABLET ORAL 3 TIMES DAILY PRN
Qty: 1 TABLET | Refills: 0
Start: 2021-07-30 | End: 2022-03-21

## 2021-07-27 RX ORDER — FENTANYL CITRATE 50 UG/ML
50 INJECTION, SOLUTION INTRAMUSCULAR; INTRAVENOUS EVERY 5 MIN PRN
Status: DISCONTINUED | OUTPATIENT
Start: 2021-07-27 | End: 2021-07-27 | Stop reason: HOSPADM

## 2021-07-27 RX ORDER — SODIUM CHLORIDE 9 MG/ML
25 INJECTION, SOLUTION INTRAVENOUS PRN
Status: DISCONTINUED | OUTPATIENT
Start: 2021-07-27 | End: 2021-07-27 | Stop reason: HOSPADM

## 2021-07-27 RX ORDER — SODIUM CHLORIDE 0.9 % (FLUSH) 0.9 %
10 SYRINGE (ML) INJECTION EVERY 12 HOURS SCHEDULED
Status: DISCONTINUED | OUTPATIENT
Start: 2021-07-27 | End: 2021-07-27 | Stop reason: HOSPADM

## 2021-07-27 RX ORDER — DEXAMETHASONE SODIUM PHOSPHATE 4 MG/ML
4 INJECTION, SOLUTION INTRA-ARTICULAR; INTRALESIONAL; INTRAMUSCULAR; INTRAVENOUS; SOFT TISSUE ONCE
Status: COMPLETED | OUTPATIENT
Start: 2021-07-27 | End: 2021-07-27

## 2021-07-27 RX ORDER — PROMETHAZINE HYDROCHLORIDE 25 MG/ML
6.25 INJECTION, SOLUTION INTRAMUSCULAR; INTRAVENOUS
Status: DISCONTINUED | OUTPATIENT
Start: 2021-07-27 | End: 2021-07-27 | Stop reason: HOSPADM

## 2021-07-27 RX ORDER — HYDROCODONE BITARTRATE AND ACETAMINOPHEN 5; 325 MG/1; MG/1
1-2 TABLET ORAL EVERY 6 HOURS PRN
Qty: 24 TABLET | Refills: 0 | Status: SHIPPED | OUTPATIENT
Start: 2021-07-27 | End: 2021-07-30

## 2021-07-27 RX ORDER — PROPOFOL 10 MG/ML
INJECTION, EMULSION INTRAVENOUS PRN
Status: DISCONTINUED | OUTPATIENT
Start: 2021-07-27 | End: 2021-07-27 | Stop reason: SDUPTHER

## 2021-07-27 RX ORDER — LABETALOL HYDROCHLORIDE 5 MG/ML
5 INJECTION, SOLUTION INTRAVENOUS EVERY 10 MIN PRN
Status: DISCONTINUED | OUTPATIENT
Start: 2021-07-27 | End: 2021-07-27 | Stop reason: HOSPADM

## 2021-07-27 RX ORDER — HYDRALAZINE HYDROCHLORIDE 20 MG/ML
5 INJECTION INTRAMUSCULAR; INTRAVENOUS EVERY 10 MIN PRN
Status: DISCONTINUED | OUTPATIENT
Start: 2021-07-27 | End: 2021-07-27 | Stop reason: HOSPADM

## 2021-07-27 RX ORDER — SODIUM CHLORIDE, SODIUM LACTATE, POTASSIUM CHLORIDE, CALCIUM CHLORIDE 600; 310; 30; 20 MG/100ML; MG/100ML; MG/100ML; MG/100ML
INJECTION, SOLUTION INTRAVENOUS CONTINUOUS
Status: DISCONTINUED | OUTPATIENT
Start: 2021-07-27 | End: 2021-07-27 | Stop reason: HOSPADM

## 2021-07-27 RX ORDER — METOCLOPRAMIDE HYDROCHLORIDE 5 MG/ML
10 INJECTION INTRAMUSCULAR; INTRAVENOUS
Status: DISCONTINUED | OUTPATIENT
Start: 2021-07-27 | End: 2021-07-27 | Stop reason: HOSPADM

## 2021-07-27 RX ORDER — FENTANYL CITRATE 50 UG/ML
INJECTION, SOLUTION INTRAMUSCULAR; INTRAVENOUS PRN
Status: DISCONTINUED | OUTPATIENT
Start: 2021-07-27 | End: 2021-07-27 | Stop reason: SDUPTHER

## 2021-07-27 RX ORDER — ONDANSETRON 2 MG/ML
INJECTION INTRAMUSCULAR; INTRAVENOUS PRN
Status: DISCONTINUED | OUTPATIENT
Start: 2021-07-27 | End: 2021-07-27 | Stop reason: SDUPTHER

## 2021-07-27 RX ADMIN — FAMOTIDINE 20 MG: 10 INJECTION, SOLUTION INTRAVENOUS at 06:42

## 2021-07-27 RX ADMIN — SODIUM CHLORIDE, POTASSIUM CHLORIDE, SODIUM LACTATE AND CALCIUM CHLORIDE: 600; 310; 30; 20 INJECTION, SOLUTION INTRAVENOUS at 06:34

## 2021-07-27 RX ADMIN — Medication 120 MG: at 07:33

## 2021-07-27 RX ADMIN — DEXAMETHASONE SODIUM PHOSPHATE 4 MG: 4 INJECTION, SOLUTION INTRAMUSCULAR; INTRAVENOUS at 06:42

## 2021-07-27 RX ADMIN — EPHEDRINE SULFATE 10 MG: 50 INJECTION INTRAMUSCULAR; INTRAVENOUS; SUBCUTANEOUS at 08:04

## 2021-07-27 RX ADMIN — ROCURONIUM BROMIDE 10 MG: 10 INJECTION, SOLUTION INTRAVENOUS at 08:07

## 2021-07-27 RX ADMIN — SODIUM CHLORIDE, SODIUM LACTATE, POTASSIUM CHLORIDE, AND CALCIUM CHLORIDE: 600; 310; 30; 20 INJECTION, SOLUTION INTRAVENOUS at 07:28

## 2021-07-27 RX ADMIN — EPHEDRINE SULFATE 10 MG: 50 INJECTION INTRAMUSCULAR; INTRAVENOUS; SUBCUTANEOUS at 07:43

## 2021-07-27 RX ADMIN — FENTANYL CITRATE 50 MCG: 50 INJECTION, SOLUTION INTRAMUSCULAR; INTRAVENOUS at 09:22

## 2021-07-27 RX ADMIN — FENTANYL CITRATE 50 MCG: 50 INJECTION, SOLUTION INTRAMUSCULAR; INTRAVENOUS at 07:33

## 2021-07-27 RX ADMIN — CEFAZOLIN SODIUM 3000 MG: 10 INJECTION, POWDER, FOR SOLUTION INTRAVENOUS at 07:43

## 2021-07-27 RX ADMIN — ONDANSETRON HYDROCHLORIDE 4 MG: 2 INJECTION, SOLUTION INTRAMUSCULAR; INTRAVENOUS at 09:03

## 2021-07-27 RX ADMIN — SUGAMMADEX 300 MG: 100 INJECTION, SOLUTION INTRAVENOUS at 09:08

## 2021-07-27 RX ADMIN — EPHEDRINE SULFATE 10 MG: 50 INJECTION INTRAMUSCULAR; INTRAVENOUS; SUBCUTANEOUS at 07:59

## 2021-07-27 RX ADMIN — ROCURONIUM BROMIDE 10 MG: 10 INJECTION, SOLUTION INTRAVENOUS at 07:33

## 2021-07-27 RX ADMIN — EPHEDRINE SULFATE 10 MG: 50 INJECTION INTRAMUSCULAR; INTRAVENOUS; SUBCUTANEOUS at 08:28

## 2021-07-27 RX ADMIN — LIDOCAINE HYDROCHLORIDE 5 ML: 10 INJECTION, SOLUTION EPIDURAL; INFILTRATION; INTRACAUDAL; PERINEURAL at 07:33

## 2021-07-27 RX ADMIN — ROCURONIUM BROMIDE 40 MG: 10 INJECTION, SOLUTION INTRAVENOUS at 07:38

## 2021-07-27 RX ADMIN — PROPOFOL 200 MG: 10 INJECTION, EMULSION INTRAVENOUS at 07:33

## 2021-07-27 RX ADMIN — FENTANYL CITRATE 50 MCG: 50 INJECTION, SOLUTION INTRAMUSCULAR; INTRAVENOUS at 08:16

## 2021-07-27 ASSESSMENT — PAIN DESCRIPTION - DESCRIPTORS: DESCRIPTORS: ACHING

## 2021-07-27 ASSESSMENT — PAIN DESCRIPTION - LOCATION: LOCATION: BACK

## 2021-07-27 ASSESSMENT — LIFESTYLE VARIABLES: SMOKING_STATUS: 0

## 2021-07-27 ASSESSMENT — PAIN SCALES - GENERAL: PAINLEVEL_OUTOF10: 5

## 2021-07-27 NOTE — OP NOTE
NEUROSURGICAL OPERATIVE REPORT      PATIENT NAME: Adrianna Barr    DATE OF SURGERY:  7/27/2021       ATTENDING SURGEON:  Nena Ibrahim. Mika Villatoro MD, PhD     PREOPERATIVE DIAGNOSIS:  Right L3/4 disk herniation with radiculopathy     POSTOPERATIVE DIAGNOSIS:  Right L3/4 disk herniation with radiculopathy     PROCEDURE PERFORMED:  1. Minimally invasive right L3/4 microdiscectomy using the METRx tubular retractor system. 2.  Intraoperative fluoroscopy with interpretation. 3.  Use of the high-power operating microscope. 4.  Intraoperative neuromonitoring with SSEP and lower extremity EMG     INDICATIONS:  Right L3/4 disc herniation with radiculopathy. PROCEDURE IN DETAIL:  The patient was identified in the preoperative area, consent for surgery was confirmed and the site for surgery was marked. The patient was transferred to the  operating room by the Anesthesia Team, where general endotracheal anesthesia was induced without difficulty. The patient was then flipped into the prone position on an Axis table and all pressure points were carefully padded. Baseline neuromonitoring waveforms were obtained. AP and lateral fluoroscopy were used to identify the inferior-aspect of the L3/4 disc space using special care due the patient's transitional S1/2 anatomy. A right-sided paramedian incision was planned. The patient's back was  prepped and draped in the usual aseptic fashion. A team pause was held according to the preformatted script, all were in agreement, and the decision was made to proceed with surgery. The planned incision was infiltrated with local anesthetic and opened sharply with a skin knife and the incision was carried down through the lumbodorsal fascia. Sequential dilators with METRx tubular retractor system were used to dilate up until the retractors were docked on the bone. A 8 cm x 16 mm retractor tube was then affixed to the retractor arm, which was secured to the table mount.   We confirmed our level and our approach with fluoroscopy and once we were satisfied with the approach, we brought in a high-powered microscope and used it for the remainder of the operation. Under high-powered microscopic view, we used Bovie electrocautery to clean remnant muscles from the lamina and medial facet. We then used a Matchstick darren on the high-speed drill to eggshell the superior portion of the L4 lamina and small amount of medial facet. Once we had done this, we used a ball tip nerve hook to dissect the ligamentum flavum of the undersurface of the lamina and the ligamentum flavum was reflected inferiorly and removed with Kerrison rongeurs. We identified the lateral edge of the thecal sac and the nerve root. We developed a plane deep to these and retracted with a nerve root retractor were immediately able to visualize and palpate the disc fragment. The overlying PLL was incised and we noted immediate extrusion of nucleas pulposus under pressure. The disc fragment was mobilized with a ball-tip probe and removed in a piece-meal fashion . We then used a ball-tipped probe to explore ventral to the thecal sac and no additional disc material was palpable. We were able to freely pass the probe in the lateral recess and out the neuroforamen along the course of the L4 nerve root. Once we had the nerve roots well decompressed, we irrigated in the field. We obtained hemostasis with bipolar cautery and Hemostatic foam.  We then irrigated a final time to confirm hemostasis. The retractors were removed and bleeding was controlled from the muscle edges with bipolar cautery. The wound was then closed in a layered fashion with absorbable suture and the skin was sealed with dermabond skin glue. Once the Dermabond had dried, the drapes were removed and control of the operation was returned to Anesthesia for extubation and transported to recovery.   All sponge, needle and instrument counts were correct at the end of the procedure and there were no changes to neuromonitoring or any apparent complications.

## 2021-07-27 NOTE — ANESTHESIA PRE PROCEDURE
40.0 to 44.9 in adult [facility]) E66.01, Z68.41    Lumbar disc herniation with radiculopathy M51.16    Lumbar spondylosis M47.816       Past Medical History:        Diagnosis Date    Borderline diabetes     Cancer ([address]     skin    Chronic back pain     Hyperlipidemia     Hypertension     Obesity        Past Surgical History:        Procedure Laterality Date    ACHILLES TENDON SURGERY      COLONOSCOPY  12/16/15    Dr Sonia Faye at [facility], normal    EYE SURGERY Bilateral     lens replaced    PRE-MALIGNANT / BENIGN SKIN LESION EXCISION  2017    RETINAL DETACHMENT SURGERY Left 10/14    RETINAL DETACHMENT SURGERY Left 07/2018    VASCULAR SURGERY  11/4/15 [facility] & [address]    Aortorenal arteriogram with oblique views of the renal arteries. Social History:    Social History     Tobacco Use    Smoking status: Never Smoker    Smokeless tobacco: Never Used   Substance Use Topics    Alcohol use: No                                Counseling given: Not Answered      Vital Signs (Current):   Vitals:    07/27/21 0616   BP: (!) 152/102   Pulse: 70   Resp: 16   Temp: 97 °F (36.1 °C)   TempSrc: Temporal   SpO2: 95%   Weight: 275 lb (124.7 kg)   Height: 5' 9\" (1.753 m)                                              BP Readings from Last 3 Encounters:   07/27/21 (!) 152/102   07/19/21 (!) 146/89   06/07/21 (!) 151/86       NPO Status: Time of last liquid consumption: 2300                        Time of last solid consumption: 2300                        Date of last liquid consumption: 07/26/21                        Date of last solid food consumption: 07/26/21    BMI:   Wt Readings from Last 3 Encounters:   07/27/21 275 lb (124.7 kg)   07/23/21 275 lb (124.7 kg)   07/19/21 275 lb (124.7 kg)     Body mass index is 40.61 kg/m².     CBC:   Lab Results   Component Value Date    WBC 6.4 07/23/2021    RBC 4.79 07/23/2021    HGB 15.4 07/23/2021    HCT 44.2 07/23/2021    MCV 92.3 07/23/2021    RDW 13.2 07/23/2021     07/23/2021 CMP:   Lab Results   Component Value Date     07/23/2021    K 3.6 07/23/2021    CL 98 07/23/2021    CO2 31 07/23/2021    BUN 31 07/23/2021    CREATININE 1.3 07/23/2021    GFRAA >59 07/23/2021    LABGLOM 55 07/23/2021    GLUCOSE 166 07/23/2021    PROT 7.4 03/17/2021    CALCIUM 9.7 07/23/2021    BILITOT 0.9 03/17/2021    ALKPHOS 75 03/17/2021    AST 22 03/17/2021    ALT 22 03/17/2021       POC Tests: No results for input(s): POCGLU, POCNA, POCK, POCCL, POCBUN, POCHEMO, POCHCT in the last 72 hours. Coags: No results found for: PROTIME, INR, APTT    HCG (If Applicable): No results found for: PREGTESTUR, PREGSERUM, HCG, HCGQUANT     ABGs: No results found for: PHART, PO2ART, KEH5EYQ, EFT6YSQ, BEART, H9UZCNBA     Type & Screen (If Applicable):  No results found for: LABABO, LABRH    Drug/Infectious Status (If Applicable):  No results found for: HIV, HEPCAB    COVID-19 Screening (If Applicable): No results found for: COVID19        Anesthesia Evaluation  Patient summary reviewed no history of anesthetic complications:   Airway: Mallampati: III  TM distance: >3 FB   Neck ROM: full  Mouth opening: > = 3 FB Dental:          Pulmonary:normal exam  breath sounds clear to auscultation      (-) asthma, recent URI, sleep apnea and not a current smoker          Patient did not smoke on day of surgery. Cardiovascular:  Exercise tolerance: good (>4 METS),   (+) hypertension:,     (-) pacemaker, past MI, CABG/stent and  angina    ECG reviewed  Rhythm: regular  Rate: normal           Beta Blocker:  Dose within 24 Hrs         Neuro/Psych:      (-) seizures, TIA and CVA           GI/Hepatic/Renal:   (+) renal disease: kidney stones, morbid obesity     (-) GERD and liver disease       Endo/Other:        (-) diabetes mellitus, hypothyroidism, hyperthyroidism               Abdominal:   (+) obese,           Vascular:           Other Findings:             Anesthesia Plan      general     ASA 3     (Preop famotidine, dexamethasone)  Induction: intravenous. MIPS: Postoperative opioids intended and Prophylactic antiemetics administered. Anesthetic plan and risks discussed with patient. Use of blood products discussed with patient whom consented to blood products.                    Nasreen Dey MD   7/27/2021

## 2021-07-29 ENCOUNTER — TELEPHONE (OUTPATIENT)
Dept: NEUROSURGERY | Age: 66
End: 2021-07-29

## 2021-07-29 DIAGNOSIS — M51.16 LUMBAR DISC HERNIATION WITH RADICULOPATHY: Primary | ICD-10-CM

## 2021-07-29 RX ORDER — METHYLPREDNISOLONE 4 MG/1
TABLET ORAL
Qty: 1 KIT | Refills: 0 | Status: SHIPPED | OUTPATIENT
Start: 2021-07-29 | End: 2021-08-04

## 2021-08-03 RX ORDER — TAMSULOSIN HYDROCHLORIDE 0.4 MG/1
0.4 CAPSULE ORAL DAILY
Qty: 90 CAPSULE | Refills: 3 | Status: SHIPPED | OUTPATIENT
Start: 2021-08-03

## 2021-08-06 ENCOUNTER — TELEPHONE (OUTPATIENT)
Dept: PRIMARY CARE CLINIC | Age: 66
End: 2021-08-06

## 2021-08-09 DIAGNOSIS — I10 ESSENTIAL HYPERTENSION: ICD-10-CM

## 2021-08-09 DIAGNOSIS — E78.00 HYPERCHOLESTEROLEMIA: ICD-10-CM

## 2021-08-09 DIAGNOSIS — R94.31 ABNORMAL EKG: Primary | ICD-10-CM

## 2021-08-09 DIAGNOSIS — E66.01 MORBID OBESITY WITH BODY MASS INDEX (BMI) OF 40.0 OR HIGHER (HCC): ICD-10-CM

## 2021-08-09 NOTE — TELEPHONE ENCOUNTER
Pt had back surgery 2 weeks ago so not sure how long and hard he can walk, can you order a dobutamine instead.  He wants to go to [facility]

## 2021-08-12 ENCOUNTER — OFFICE VISIT (OUTPATIENT)
Dept: NEUROSURGERY | Age: 66
End: 2021-08-12

## 2021-08-12 VITALS
BODY MASS INDEX: 40.73 KG/M2 | SYSTOLIC BLOOD PRESSURE: 140 MMHG | DIASTOLIC BLOOD PRESSURE: 85 MMHG | WEIGHT: 275 LBS | HEIGHT: 69 IN | HEART RATE: 57 BPM

## 2021-08-12 DIAGNOSIS — Z98.890 S/P LUMBAR DISCECTOMY: ICD-10-CM

## 2021-08-12 DIAGNOSIS — M51.16 LUMBAR DISC HERNIATION WITH RADICULOPATHY: Primary | ICD-10-CM

## 2021-08-12 PROCEDURE — 99024 POSTOP FOLLOW-UP VISIT: CPT | Performed by: NEUROLOGICAL SURGERY

## 2021-08-12 NOTE — PROGRESS NOTES
NEUROSURGERY POSTOPERATIVE FOLLOW UP NOTE      Chief Complaint:   Chief Complaint   Patient presents with    Follow-up     wound check for microdiscectomy. Date of Surgery: 7/27/2021    Procedure Performed: Right L3/4 MetRx Microdiscectomy      Interval Update:  First follow-up visit after surgery. He is doing well. Immediately after surgery he noted great relief of his right leg pain and was able to resume walking with his wife. He developed an increase in pain on ~POD#3 with pain radiating into his buttock and thigh. He has since completed a medrol dosepak and this pain has markedly improved. He is not requiring any pain medication beyond ibuprofen. He is taking a 1/2 tizanidine tablet at night to help with sleep. He denies any incisional concerns. HPI: The patient is a 74 y.o. male presented with lower back pain and right leg pain that did not improve with conservative measures. He attempted physical therapy for a right-sided inferior L3/4 disc herniation causing right leg pain He continued to endorse back pain radiating mostly into his right buttock, anterior and lateral thigh. He underwent a right-sided L3/4 MetRx microdiscectomy on 7/27/2021. Objective:    BP (!) 140/85   Pulse 57   Ht 5' 9\" (1.753 m)   Wt 275 lb (124.7 kg)   BMI 40.61 kg/m²         Physical Exam:    General: alert, cooperative, no distress  Cardiorespiratory: unlabored breathing  Wound: clean, dry, no drainage      Neurologic Exam:    Mental Status: Alert, oriented, thought content appropriate  Cranial Nerves: PERRL, EOMI, symmetric facies, tongue midline  Motor: Motor exam is symmetrical 5 out of 5 all extremities bilaterally  Somatosensory: normal light touch sensation      Assessment and Plan:  74 y.o. M returns for his first postoperative visit following a right L3/4 MetRx microdiscectomy on 7/27/2021. He is doing quite well.   He did have a transient return of radicular pain after surgery that has dramatically improved after completing a Medrol dosepak. He has resumed walking with his wife.   He has normal strength and sensation in his lower extremities on neurologic exam.    -Continue bending and twisting restrictions, OK to lift up to 15 lbs  -Continue ibuprofen and muscle-relaxers as needed  -Encouraged walking and light activity for reconditioning  -Follow up in six weeks        Electronically signed by Edouard Pina MD on 8/12/2021 at 10:36 AM

## 2021-08-24 ENCOUNTER — HOSPITAL ENCOUNTER (OUTPATIENT)
Dept: NON INVASIVE DIAGNOSTICS | Age: 66
Discharge: HOME | End: 2021-08-24
Payer: MEDICARE

## 2021-08-24 VITALS — BODY MASS INDEX: 39.87 KG/M2 | WEIGHT: 270 LBS

## 2021-08-24 DIAGNOSIS — E78.00 HYPERCHOLESTEROLEMIA: ICD-10-CM

## 2021-08-24 DIAGNOSIS — I10 ESSENTIAL HYPERTENSION: ICD-10-CM

## 2021-08-24 DIAGNOSIS — E66.01 MORBID OBESITY WITH BODY MASS INDEX (BMI) OF 40.0 OR HIGHER (HCC): ICD-10-CM

## 2021-08-24 DIAGNOSIS — R94.31 ABNORMAL EKG: ICD-10-CM

## 2021-08-24 PROCEDURE — 6360000004 HC RX CONTRAST MEDICATION: Performed by: INTERNAL MEDICINE

## 2021-08-24 PROCEDURE — 6360000002 HC RX W HCPCS

## 2021-08-24 PROCEDURE — C8928 TTE W OR W/O FOL W/CON,STRES: HCPCS

## 2021-08-24 PROCEDURE — 2580000003 HC RX 258: Performed by: INTERNAL MEDICINE

## 2021-08-24 RX ORDER — SODIUM CHLORIDE 9 MG/ML
INJECTION, SOLUTION INTRAVENOUS
Status: COMPLETED | OUTPATIENT
Start: 2021-08-24 | End: 2021-08-24

## 2021-08-24 RX ORDER — DOBUTAMINE HYDROCHLORIDE 200 MG/100ML
10 INJECTION INTRAVENOUS CONTINUOUS PRN
Status: DISCONTINUED | OUTPATIENT
Start: 2021-08-24 | End: 2021-08-25 | Stop reason: HOSPADM

## 2021-08-24 RX ORDER — DOBUTAMINE HYDROCHLORIDE 200 MG/100ML
INJECTION INTRAVENOUS
Status: COMPLETED
Start: 2021-08-24 | End: 2021-08-24

## 2021-08-24 RX ADMIN — SODIUM CHLORIDE: 9 INJECTION, SOLUTION INTRAVENOUS at 11:04

## 2021-08-24 RX ADMIN — PERFLUTREN 2.2 MG: 6.52 INJECTION, SUSPENSION INTRAVENOUS at 11:00

## 2021-08-24 RX ADMIN — DOBUTAMINE IN DEXTROSE 10 MCG/KG/MIN: 200 INJECTION, SOLUTION INTRAVENOUS at 11:04

## 2021-08-24 RX ADMIN — DOBUTAMINE HYDROCHLORIDE 10 MCG/KG/MIN: 200 INJECTION INTRAVENOUS at 11:04

## 2021-09-23 ENCOUNTER — OFFICE VISIT (OUTPATIENT)
Dept: NEUROSURGERY | Age: 66
End: 2021-09-23

## 2021-09-23 VITALS
SYSTOLIC BLOOD PRESSURE: 139 MMHG | HEART RATE: 67 BPM | HEIGHT: 69 IN | DIASTOLIC BLOOD PRESSURE: 84 MMHG | BODY MASS INDEX: 39.99 KG/M2 | WEIGHT: 270 LBS

## 2021-09-23 DIAGNOSIS — Z98.890 S/P LUMBAR DISCECTOMY: Primary | ICD-10-CM

## 2021-09-23 DIAGNOSIS — M51.16 LUMBAR DISC HERNIATION WITH RADICULOPATHY: ICD-10-CM

## 2021-09-23 PROCEDURE — 99024 POSTOP FOLLOW-UP VISIT: CPT | Performed by: NEUROLOGICAL SURGERY

## 2021-09-23 NOTE — PROGRESS NOTES
NEUROSURGERY POSTOPERATIVE FOLLOW UP NOTE      Chief Complaint:   Chief Complaint   Patient presents with    Follow-up     back pain. Date of Surgery: 7/27/2021    Procedure Performed: Right L3/4 MetRx Microdiscectomy      Interval Update: Planned follow-up visit after surgery. He is doing quite well. He reports minimal back pain. His radicular pain has resolved. He feels he is regaining the sensation in his right leg and his strength is nearly back to normal.  He continues to walk and attempt to remain active while still avoiding bending, lifting and twisting. HPI: The patient is a 74 y.o. male presented with lower back pain and right leg pain that did not improve with conservative measures. He attempted physical therapy for a right-sided inferior L3/4 disc herniation causing right leg pain He continued to endorse back pain radiating mostly into his right buttock, anterior and lateral thigh. He underwent a right-sided L3/4 MetRx microdiscectomy on 7/27/2021. Objective:    /84   Pulse 67   Ht 5' 9\" (1.753 m)   Wt 270 lb (122.5 kg)   BMI 39.87 kg/m²         Physical Exam:    General: alert, cooperative, no distress  Cardiorespiratory: unlabored breathing  Wound: healed      Neurologic Exam:    Mental Status: Alert, oriented, thought content appropriate  Cranial Nerves: PERRL, EOMI, symmetric facies, tongue midline  Motor: Motor exam is symmetrical 5 out of 5 all extremities bilaterally  Somatosensory: normal light touch sensation      Assessment and Plan:  74 y.o. M returns for his first postoperative visit following a right L3/4 MetRx microdiscectomy on 7/27/2021. He is doing quite well.   His radicular pain has resolved, his back pain is minimal and he feels the strength and sensation in his right leg are returning to normal.      -Continue bending and twisting restrictions until 3m postop, OK to lift up to 15 lbs  -Continue walking and light activity for reconditioning  -Follow up in one month      Electronically signed by Oliva Kingston MD on 9/23/2021 at 11:06 AM

## 2021-10-12 NOTE — PROGRESS NOTES
Subjective    Mr. Melo is 66 y.o. male    Chief Complaint: Elevated PSA    History of Present Illness    66-year-old male established patient follow-up for worsening elevated PSA.  He had benign prostate biopsy April 2021 for PSA of 4.3.  His most recent PSA is slightly up at 4.7.   LUTS well-controlled on tamsulosin.  He denies a family history of prostate cancer.  No hematuria or dysuria.    Lab Results   Component Value Date    PSA 4.7 (H) 10/19/2021    PSA 4.36 (H) 03/17/2021    PSA 3.45 03/16/2020       The following portions of the patient's history were reviewed and updated as appropriate: allergies, current medications, past family history, past medical history, past social history, past surgical history and problem list.    Review of Systems      Current Outpatient Medications:   •  amLODIPine-benazepril (LOTREL 5-20) 5-20 MG per capsule, Take 1 capsule by mouth., Disp: , Rfl:   •  levoFLOXacin (LEVAQUIN) 500 MG tablet, 1 tab by mouth the evening prior to prostate biopsy, Disp: 1 tablet, Rfl: 0  •  metoprolol succinate XL (TOPROL-XL) 50 MG 24 hr tablet, Take 50 mg by mouth Daily., Disp: , Rfl:   •  pravastatin (PRAVACHOL) 40 MG tablet, Take 80 mg by mouth Every Night., Disp: , Rfl:   •  sodium phosphate (FLEET) 7-19 GM/118ML enema, Use rectally the evening prior to morning of prostate biopsy, Disp: 1 enema, Rfl: 0  •  tamsulosin (FLOMAX) 0.4 MG capsule 24 hr capsule, TAKE 1 CAPSULE BY MOUTH EVERY DAY, Disp: , Rfl:     Past Medical History:   Diagnosis Date   • High cholesterol    • Hypertension        History reviewed. No pertinent surgical history.    Social History     Socioeconomic History   • Marital status: Unknown   Tobacco Use   • Smoking status: Never Smoker   • Smokeless tobacco: Never Used   Vaping Use   • Vaping Use: Never used   Substance and Sexual Activity   • Alcohol use: Never   • Drug use: Never   • Sexual activity: Defer       Family History   Problem Relation Age of Onset   • No Known  "Problems Father    • No Known Problems Mother        Objective    Temp 97.4 °F (36.3 °C)   Ht 175.3 cm (69\")   Wt 122 kg (270 lb)   BMI 39.87 kg/m²     Physical Exam        Results for orders placed or performed in visit on 10/21/21   POC Urinalysis Dipstick, Multipro    Specimen: Urine   Result Value Ref Range    Color Yellow Yellow, Straw, Dark Yellow, Jennifer    Clarity, UA Clear Clear    Glucose, UA Negative Negative, 1000 mg/dL (3+) mg/dL    Bilirubin Negative Negative    Ketones, UA Negative Negative    Specific Gravity  1.030 1.005 - 1.030    Blood, UA Trace (A) Negative    pH, Urine 6.0 5.0 - 8.0    Protein, POC Negative Negative mg/dL    Urobilinogen, UA Normal Normal    Nitrite, UA Negative Negative    Leukocytes Negative Negative     Assessment and Plan    Diagnoses and all orders for this visit:    1. Elevated PSA (Primary)  -     POC Urinalysis Dipstick, Multipro  -     PSA DIAGNOSTIC; Future      Slightly elevated PSA with recent benign prostate biopsy.  I recommended follow-up with me in 6 months with repeat preclinic PSA.  If PSA remains elevated at this time, will discuss prostate MRI.  Continue tamsulosin.      This document has been signed by BREANN Hernandez MD on October 23, 2021 22:40 CDT            "

## 2021-10-18 RX ORDER — PRAVASTATIN SODIUM 80 MG/1
TABLET ORAL
Qty: 90 TABLET | Refills: 1 | Status: SHIPPED | OUTPATIENT
Start: 2021-10-18 | End: 2022-02-07

## 2021-10-19 ENCOUNTER — CLINICAL SUPPORT (OUTPATIENT)
Dept: INTERNAL MEDICINE | Facility: CLINIC | Age: 66
End: 2021-10-19

## 2021-10-19 DIAGNOSIS — E78.2 MIXED HYPERLIPIDEMIA: ICD-10-CM

## 2021-10-19 DIAGNOSIS — I10 PRIMARY HYPERTENSION: ICD-10-CM

## 2021-10-19 DIAGNOSIS — E78.2 MIXED HYPERLIPIDEMIA: Primary | ICD-10-CM

## 2021-10-19 DIAGNOSIS — R97.20 ELEVATED PSA: ICD-10-CM

## 2021-10-19 DIAGNOSIS — R63.4 WEIGHT LOSS: ICD-10-CM

## 2021-10-19 DIAGNOSIS — I10 PRIMARY HYPERTENSION: Primary | ICD-10-CM

## 2021-10-19 PROCEDURE — 36415 COLL VENOUS BLD VENIPUNCTURE: CPT | Performed by: INTERNAL MEDICINE

## 2021-10-19 NOTE — PROGRESS NOTES
Venipuncture Blood Specimen Collection  Venipuncture performed in right ac by Elsa Marino MA with good hemostasis. Patient tolerated the procedure well without complications.   10/19/21   Elsa Marino MA

## 2021-10-21 ENCOUNTER — OFFICE VISIT (OUTPATIENT)
Dept: UROLOGY | Facility: CLINIC | Age: 66
End: 2021-10-21

## 2021-10-21 VITALS — WEIGHT: 270 LBS | HEIGHT: 69 IN | BODY MASS INDEX: 39.99 KG/M2 | TEMPERATURE: 97.4 F

## 2021-10-21 DIAGNOSIS — R97.20 ELEVATED PSA: Primary | ICD-10-CM

## 2021-10-21 LAB
ALBUMIN SERPL-MCNC: 4.4 G/DL (ref 3.8–4.8)
ALBUMIN/GLOB SERPL: 2 {RATIO} (ref 1.2–2.2)
ALP SERPL-CCNC: 70 IU/L (ref 44–121)
ALT SERPL-CCNC: 21 IU/L (ref 0–44)
AST SERPL-CCNC: 22 IU/L (ref 0–40)
BASOPHILS # BLD AUTO: 0.1 X10E3/UL (ref 0–0.2)
BASOPHILS NFR BLD AUTO: 1 %
BILIRUB BLD-MCNC: NEGATIVE MG/DL
BILIRUB SERPL-MCNC: 0.8 MG/DL (ref 0–1.2)
BUN SERPL-MCNC: 17 MG/DL (ref 8–27)
BUN/CREAT SERPL: 14 (ref 10–24)
CALCIUM SERPL-MCNC: 9.1 MG/DL (ref 8.6–10.2)
CHLORIDE SERPL-SCNC: 99 MMOL/L (ref 96–106)
CHOLEST SERPL-MCNC: 180 MG/DL (ref 100–199)
CLARITY, POC: CLEAR
CO2 SERPL-SCNC: 27 MMOL/L (ref 20–29)
COLOR UR: YELLOW
CREAT SERPL-MCNC: 1.18 MG/DL (ref 0.76–1.27)
EOSINOPHIL # BLD AUTO: 0.1 X10E3/UL (ref 0–0.4)
EOSINOPHIL NFR BLD AUTO: 2 %
ERYTHROCYTE [DISTWIDTH] IN BLOOD BY AUTOMATED COUNT: 12.8 % (ref 11.6–15.4)
GLOBULIN SER CALC-MCNC: 2.2 G/DL (ref 1.5–4.5)
GLUCOSE SERPL-MCNC: 110 MG/DL (ref 65–99)
GLUCOSE UR STRIP-MCNC: NEGATIVE MG/DL
HCT VFR BLD AUTO: 46.4 % (ref 37.5–51)
HDLC SERPL-MCNC: 47 MG/DL
HGB BLD-MCNC: 15.3 G/DL (ref 13–17.7)
IMM GRANULOCYTES # BLD AUTO: 0 X10E3/UL (ref 0–0.1)
IMM GRANULOCYTES NFR BLD AUTO: 0 %
KETONES UR QL: NEGATIVE
LDLC SERPL CALC-MCNC: 114 MG/DL (ref 0–99)
LEUKOCYTE EST, POC: NEGATIVE
LYMPHOCYTES # BLD AUTO: 2.2 X10E3/UL (ref 0.7–3.1)
LYMPHOCYTES NFR BLD AUTO: 37 %
MCH RBC QN AUTO: 31.2 PG (ref 26.6–33)
MCHC RBC AUTO-ENTMCNC: 33 G/DL (ref 31.5–35.7)
MCV RBC AUTO: 95 FL (ref 79–97)
MONOCYTES # BLD AUTO: 0.5 X10E3/UL (ref 0.1–0.9)
MONOCYTES NFR BLD AUTO: 8 %
MORPHOLOGY BLD-IMP: NORMAL
NEUTROPHILS # BLD AUTO: 3.1 X10E3/UL (ref 1.4–7)
NEUTROPHILS NFR BLD AUTO: 52 %
NITRITE UR-MCNC: NEGATIVE MG/ML
PH UR: 6 [PH] (ref 5–8)
PLATELET # BLD AUTO: 180 X10E3/UL (ref 150–450)
POTASSIUM SERPL-SCNC: 3.4 MMOL/L (ref 3.5–5.2)
PROT SERPL-MCNC: 6.6 G/DL (ref 6–8.5)
PROT UR STRIP-MCNC: NEGATIVE MG/DL
PSA SERPL-MCNC: 4.7 NG/ML (ref 0–4)
RBC # BLD AUTO: 4.91 X10E6/UL (ref 4.14–5.8)
RBC # UR STRIP: ABNORMAL /UL
SODIUM SERPL-SCNC: 141 MMOL/L (ref 134–144)
SP GR UR: 1.03 (ref 1–1.03)
TRIGL SERPL-MCNC: 106 MG/DL (ref 0–149)
TSH SERPL DL<=0.005 MIU/L-ACNC: 3.49 UIU/ML (ref 0.45–4.5)
UROBILINOGEN UR QL: NORMAL
VLDLC SERPL CALC-MCNC: 19 MG/DL (ref 5–40)
WBC # BLD AUTO: 5.9 X10E3/UL (ref 3.4–10.8)

## 2021-10-21 PROCEDURE — 81003 URINALYSIS AUTO W/O SCOPE: CPT | Performed by: UROLOGY

## 2021-10-21 PROCEDURE — 99213 OFFICE O/P EST LOW 20 MIN: CPT | Performed by: UROLOGY

## 2021-10-21 NOTE — PATIENT INSTRUCTIONS
"BMI for Adults  What is BMI?  Body mass index (BMI) is a number that is calculated from a person's weight and height. BMI can help estimate how much of a person's weight is composed of fat. BMI does not measure body fat directly. Rather, it is an alternative to procedures that directly measure body fat, which can be difficult and expensive.  BMI can help identify people who may be at higher risk for certain medical problems.  What are BMI measurements used for?  BMI is used as a screening tool to identify possible weight problems. It helps determine whether a person is obese, overweight, a healthy weight, or underweight.  BMI is useful for:  · Identifying a weight problem that may be related to a medical condition or may increase the risk for medical problems.  · Promoting changes, such as changes in diet and exercise, to help reach a healthy weight. BMI screening can be repeated to see if these changes are working.  How is BMI calculated?  BMI involves measuring your weight in relation to your height. Both height and weight are measured, and the BMI is calculated from those numbers. This can be done either in English (U[place] or metric measurements. Note that charts and online BMI calculators are available to help you find your BMI quickly and easily without having to do these calculations yourself.  To calculate your BMI in English ([place] measurements:    1. Measure your weight in pounds (lb).  2. Multiply the number of pounds by 703.  ? For example, for a person who weighs 180 lb, multiply that number by 703, which equals 126,540.  3. Measure your height in inches. Then multiply that number by itself to get a measurement called \"inches squared.\"  ? For example, for a person who is 70 inches tall, the \"inches squared\" measurement is 70 inches x 70 inches, which equals 4,900 inches squared.  4. Divide the total from step 2 (number of lb x 703) by the total from step 3 (inches squared): 126,540 ÷ 4,900 = 25.8. This is " "your BMI.    To calculate your BMI in metric measurements:  1. Measure your weight in kilograms (kg).  2. Measure your height in meters (m). Then multiply that number by itself to get a measurement called \"meters squared.\"  ? For example, for a person who is 1.75 m tall, the \"meters squared\" measurement is 1.75 m x 1.75 m, which is equal to 3.1 meters squared.  3. Divide the number of kilograms (your weight) by the meters squared number. In this example: 70 ÷ 3.1 = 22.6. This is your BMI.  What do the results mean?  BMI charts are used to identify whether you are underweight, normal weight, overweight, or obese. The following guidelines will be used:  · Underweight: BMI less than 18.5.  · Normal weight: BMI between 18.5 and 24.9.  · Overweight: BMI between 25 and 29.9.  · Obese: BMI of 30 or above.  Keep these notes in mind:  · Weight includes both fat and muscle, so someone with a muscular build, such as an athlete, may have a BMI that is higher than 24.9. In cases like these, BMI is not an accurate measure of body fat.  · To determine if excess body fat is the cause of a BMI of 25 or higher, further assessments may need to be done by a health care provider.  · BMI is usually interpreted in the same way for men and women.  Where to find more information  For more information about BMI, including tools to quickly calculate your BMI, go to these websites:  · Centers for Disease Control and Prevention: www.cdc.gov  · American Heart Association: www.heart.org  · National Heart, Lung, and Blood [place]: www.nhlbi.nih.gov  Summary  · Body mass index (BMI) is a number that is calculated from a person's weight and height.  · BMI may help estimate how much of a person's weight is composed of fat. BMI can help identify those who may be at higher risk for certain medical problems.  · BMI can be measured using English measurements or metric measurements.  · BMI charts are used to identify whether you are underweight, normal " weight, overweight, or obese.  This information is not intended to replace advice given to you by your health care provider. Make sure you discuss any questions you have with your health care provider.  Document Revised: 09/09/2020 Document Reviewed: 07/17/2020  Elsevier Patient Education © 2021 [employer]

## 2021-10-25 ENCOUNTER — OFFICE VISIT (OUTPATIENT)
Dept: NEUROSURGERY | Age: 66
End: 2021-10-25

## 2021-10-25 VITALS
SYSTOLIC BLOOD PRESSURE: 149 MMHG | BODY MASS INDEX: 40.29 KG/M2 | HEART RATE: 49 BPM | DIASTOLIC BLOOD PRESSURE: 77 MMHG | WEIGHT: 272 LBS | HEIGHT: 69 IN

## 2021-10-25 DIAGNOSIS — Z98.890 S/P LUMBAR DISCECTOMY: Primary | ICD-10-CM

## 2021-10-25 PROCEDURE — 99024 POSTOP FOLLOW-UP VISIT: CPT | Performed by: NEUROLOGICAL SURGERY

## 2021-10-25 NOTE — PROGRESS NOTES
NEUROSURGERY POSTOPERATIVE FOLLOW UP NOTE      Chief Complaint:   Chief Complaint   Patient presents with    Follow-up     back pain,. Date of Surgery: 7/27/2021    Procedure Performed: Right L3/4 MetRx Microdiscectomy      Interval Update: Planned follow-up visit after surgery. He is doing remarkably well. He denies any back pain. His radicular pain has resolved. He has regained normal strength and sensation in his right leg. He reports that his mobility and flexibility has returned to normal.    HPI: The patient is a 74 y.o. male presented with lower back pain and right leg pain that did not improve with conservative measures. He attempted physical therapy for a right-sided inferior L3/4 disc herniation causing right leg pain He continued to endorse back pain radiating mostly into his right buttock, anterior and lateral thigh. He underwent a right-sided L3/4 MetRx microdiscectomy on 7/27/2021. Objective:    BP (!) 149/77   Pulse (!) 49   Ht 5' 9\" (1.753 m)   Wt 272 lb (123.4 kg)   BMI 40.17 kg/m²         Physical Exam:    General: alert, cooperative, no distress  Cardiorespiratory: unlabored breathing  Wound: healed      Neurologic Exam:    Mental Status: Alert, oriented, thought content appropriate  Cranial Nerves: PERRL, EOMI, symmetric facies, tongue midline  Motor: Motor exam is symmetrical 5 out of 5 all extremities bilaterally  Somatosensory: normal light touch sensation      Assessment and Plan:  74 y.o. M returns for a 3m postoperative visit following a right L3/4 MetRx microdiscectomy on 7/27/2021. He is doing quite well.   His radicular pain has resolved, he denies back pain and his strength and sensation in his right leg have returned to normal.      -OK to resume normal activities without restriction  -Follow up PRN      Electronically signed by Wan Talley MD on 10/25/2021 at 11:15 AM

## 2021-12-22 RX ORDER — AMLODIPINE BESYLATE AND BENAZEPRIL HYDROCHLORIDE 5; 20 MG/1; MG/1
CAPSULE ORAL
Qty: 90 CAPSULE | Refills: 3 | Status: SHIPPED | OUTPATIENT
Start: 2021-12-22

## 2022-01-26 RX ORDER — INDAPAMIDE 1.25 MG/1
1.25 TABLET, FILM COATED ORAL EVERY MORNING
Qty: 90 TABLET | Refills: 3 | Status: SHIPPED | OUTPATIENT
Start: 2022-01-26

## 2022-02-07 RX ORDER — PRAVASTATIN SODIUM 80 MG/1
TABLET ORAL
Qty: 90 TABLET | Refills: 1 | Status: SHIPPED | OUTPATIENT
Start: 2022-02-07 | End: 2022-03-23 | Stop reason: ALTCHOICE

## 2022-03-21 ENCOUNTER — OFFICE VISIT (OUTPATIENT)
Dept: PRIMARY CARE CLINIC | Age: 67
End: 2022-03-21
Payer: MEDICARE

## 2022-03-21 VITALS
HEIGHT: 69 IN | TEMPERATURE: 97.4 F | OXYGEN SATURATION: 96 % | HEART RATE: 57 BPM | DIASTOLIC BLOOD PRESSURE: 86 MMHG | WEIGHT: 272.8 LBS | RESPIRATION RATE: 18 BRPM | BODY MASS INDEX: 40.4 KG/M2 | SYSTOLIC BLOOD PRESSURE: 128 MMHG

## 2022-03-21 DIAGNOSIS — E66.01 OBESITY, CLASS III, BMI 40-49.9 (MORBID OBESITY) (HCC): ICD-10-CM

## 2022-03-21 DIAGNOSIS — R97.20 ELEVATED PSA: ICD-10-CM

## 2022-03-21 DIAGNOSIS — I10 PRIMARY HYPERTENSION: ICD-10-CM

## 2022-03-21 DIAGNOSIS — Z00.00 MEDICARE ANNUAL WELLNESS VISIT, SUBSEQUENT: Primary | ICD-10-CM

## 2022-03-21 DIAGNOSIS — Z12.11 SCREEN FOR COLON CANCER: ICD-10-CM

## 2022-03-21 DIAGNOSIS — E78.2 MIXED HYPERLIPIDEMIA: ICD-10-CM

## 2022-03-21 LAB
ALBUMIN SERPL-MCNC: 4.5 G/DL (ref 3.5–5.2)
ALP BLD-CCNC: 68 U/L (ref 40–130)
ALT SERPL-CCNC: 20 U/L (ref 5–41)
ANION GAP SERPL CALCULATED.3IONS-SCNC: 14 MMOL/L (ref 7–19)
APPEARANCE FLUID: CLEAR
AST SERPL-CCNC: 21 U/L (ref 5–40)
BILIRUB SERPL-MCNC: 0.7 MG/DL (ref 0.2–1.2)
BILIRUBIN, POC: NORMAL
BLOOD URINE, POC: NORMAL
BUN BLDV-MCNC: 17 MG/DL (ref 8–23)
CALCIUM SERPL-MCNC: 9.1 MG/DL (ref 8.8–10.2)
CHLORIDE BLD-SCNC: 102 MMOL/L (ref 98–111)
CHOLESTEROL, TOTAL: 193 MG/DL (ref 160–199)
CLARITY, POC: CLEAR
CO2: 28 MMOL/L (ref 22–29)
COLOR, POC: YELLOW
CREAT SERPL-MCNC: 0.9 MG/DL (ref 0.5–1.2)
GFR AFRICAN AMERICAN: >59
GFR NON-AFRICAN AMERICAN: >60
GLUCOSE BLD-MCNC: 123 MG/DL (ref 74–109)
GLUCOSE URINE, POC: NORMAL
HDLC SERPL-MCNC: 54 MG/DL (ref 55–121)
KETONES, POC: NORMAL
LDL CHOLESTEROL CALCULATED: 125 MG/DL
LEUKOCYTE EST, POC: NORMAL
NITRITE, POC: NORMAL
PH, POC: 5
POTASSIUM SERPL-SCNC: 3.2 MMOL/L (ref 3.5–5)
PROSTATE SPECIFIC ANTIGEN: 5.15 NG/ML (ref 0–4)
PROTEIN, POC: NORMAL
SODIUM BLD-SCNC: 144 MMOL/L (ref 136–145)
SPECIFIC GRAVITY, POC: 1.02
TOTAL PROTEIN: 6.9 G/DL (ref 6.6–8.7)
TRIGL SERPL-MCNC: 68 MG/DL (ref 0–149)
UROBILINOGEN, POC: NORMAL

## 2022-03-21 PROCEDURE — G8427 DOCREV CUR MEDS BY ELIG CLIN: HCPCS | Performed by: FAMILY MEDICINE

## 2022-03-21 PROCEDURE — G8484 FLU IMMUNIZE NO ADMIN: HCPCS | Performed by: FAMILY MEDICINE

## 2022-03-21 PROCEDURE — G8417 CALC BMI ABV UP PARAM F/U: HCPCS | Performed by: FAMILY MEDICINE

## 2022-03-21 PROCEDURE — G0439 PPPS, SUBSEQ VISIT: HCPCS | Performed by: FAMILY MEDICINE

## 2022-03-21 PROCEDURE — 1123F ACP DISCUSS/DSCN MKR DOCD: CPT | Performed by: FAMILY MEDICINE

## 2022-03-21 PROCEDURE — 81002 URINALYSIS NONAUTO W/O SCOPE: CPT | Performed by: FAMILY MEDICINE

## 2022-03-21 PROCEDURE — 3017F COLORECTAL CA SCREEN DOC REV: CPT | Performed by: FAMILY MEDICINE

## 2022-03-21 PROCEDURE — 1036F TOBACCO NON-USER: CPT | Performed by: FAMILY MEDICINE

## 2022-03-21 PROCEDURE — 99213 OFFICE O/P EST LOW 20 MIN: CPT | Performed by: FAMILY MEDICINE

## 2022-03-21 PROCEDURE — 4040F PNEUMOC VAC/ADMIN/RCVD: CPT | Performed by: FAMILY MEDICINE

## 2022-03-21 ASSESSMENT — PATIENT HEALTH QUESTIONNAIRE - PHQ9
SUM OF ALL RESPONSES TO PHQ QUESTIONS 1-9: 2
1. LITTLE INTEREST OR PLEASURE IN DOING THINGS: 1
SUM OF ALL RESPONSES TO PHQ QUESTIONS 1-9: 2
SUM OF ALL RESPONSES TO PHQ QUESTIONS 1-9: 2
SUM OF ALL RESPONSES TO PHQ9 QUESTIONS 1 & 2: 2
SUM OF ALL RESPONSES TO PHQ QUESTIONS 1-9: 2
2. FEELING DOWN, DEPRESSED OR HOPELESS: 1

## 2022-03-21 ASSESSMENT — LIFESTYLE VARIABLES: HOW OFTEN DO YOU HAVE A DRINK CONTAINING ALCOHOL: NEVER

## 2022-03-21 NOTE — PROGRESS NOTES
Medicare Annual Wellness Visit    Hetal Ho is here for Medicare AWV (Pt si here for annual wellness visit. Pt cites no concerns. Pt is fasting.)    Assessment & Plan   Medicare annual wellness visit, subsequent  Mixed hyperlipidemia  Assessment & Plan:  Uncertain status until we get the results of his blood work. Continue atorvastatin 40 mg 1 daily. He is having no problems with this medication. We will refill when he needs it. Orders:  -     POCT Urinalysis no Micro  -     Lipid Panel  Primary hypertension  Assessment & Plan:  Controlled. Continue benazepril/amlodipine 20/five 1 capsule daily. We will refill when needed. He is also on indapamide 1.25 mg 1 daily and Toprol- mg 1 daily. He does not need refills of these at the present time  Orders:  -     POCT Urinalysis no Micro  -     Comprehensive Metabolic Panel  Obesity, Class III, BMI 40-49.9 (morbid obesity) ([facility] [address]  Assessment & Plan:  Wt Readings from Last 3 Encounters:   03/21/22 272 lb 12.8 oz (123.7 kg)   10/25/21 272 lb (123.4 kg)   09/23/21 270 lb (122.5 kg)   Stable but not controlled. Continue to work on diet and exercise. Elevated PSA  -     PSA, Diagnostic  Screen for colon cancer  -     Fecal DNA Colorectal cancer screening (Cologuard)      Recommendations for Preventive Services Due: see orders and patient instructions/AVS.      He has had an elevated PSA and so we will repeat this. He is having no increase in symptoms. We did discuss the risks and benefits of screening for prostate cancer although this is diagnostic since he has had an elevated one in the past.  He is on Flomax 0.4 mg 1 daily. He will continue this. Recommended screening schedule for the next 5-10 years is provided to the patient in written form: see Patient Instructions/AVS.     Return in 1 year (on 3/21/2023) for Medicare Annual Wellness Visit in 1 year.      Subjective   He came in for a Medicare annual wellness but he has other problems we follow including hypercholesterolemia and hypertension. He says he is doing well. He is trying to lose weight. His colonoscopy is due. He denies any bright red blood per rectum or dark tarry stools. His last one was done by Dr. Rene Yi at [facility]. He would like to do Cologuard now. He says there is no family history of colon cancer. Patient's complete Health Risk Assessment and screening values have been reviewed and are found in Flowsheets. The following problems were reviewed today and where indicated follow up appointments were made and/or referrals ordered. Positive Risk Factor Screenings with Interventions:              Health Habits/Nutrition:     Physical Activity: Insufficiently Active    Days of Exercise per Week: 4 days    Minutes of Exercise per Session: 30 min     Have you lost any weight without trying in the past 3 months?: No  Body mass index: (!) 40.28  Have you seen the dentist within the past year?: Yes    Health Habits/Nutrition Interventions:  · Nutritional issues:  educational materials to promote weight loss provided             Objective   Vitals:    03/21/22 1056   BP: 128/86   Site: Left Upper Arm   Position: Sitting   Cuff Size: Large Adult   Pulse: 57   Resp: 18   Temp: 97.4 °F (36.3 °C)   TempSrc: Temporal   SpO2: 96%   Weight: 272 lb 12.8 oz (123.7 kg)   Height: 5' 9\" (1.753 m)      Body mass index is 40.29 kg/m².         General Appearance: alert and oriented to person, place and time, well developed and well- nourished, in no acute distress  Skin: warm and dry, no rash or erythema  Head: normocephalic and atraumatic  Eyes: pupils equal, round, and reactive to light, extraocular eye movements intact, conjunctivae normal  ENT: tympanic membrane, external ear and ear canal normal bilaterally, nose without deformity, nasal mucosa and turbinates normal without polyps  Neck: supple and non-tender without mass, no thyromegaly or thyroid nodules, no cervical lymphadenopathy  Pulmonary/Chest: clear to auscultation bilaterally- no wheezes, rales or rhonchi, normal air movement, no respiratory distress  Cardiovascular: normal rate, regular rhythm, normal S1 and S2, no murmurs, rubs, clicks, or gallops, distal pulses intact, no carotid bruits  Abdomen: soft, non-tender, non-distended, normal bowel sounds, no masses or organomegaly  Extremities: no cyanosis, clubbing or edema  Musculoskeletal: normal range of motion, no joint swelling, deformity or tenderness  Neurologic: reflexes normal and symmetric, no cranial nerve deficit, gait, coordination and speech normal       No Known Allergies  Prior to Visit Medications    Medication Sig Taking?  Authorizing Provider   indapamide (LOZOL) 1.25 MG tablet TAKE 1 TABLET BY MOUTH EVERY MORNING FOR HIGH BLOOD PRESSURE Yes Leona Sherman MD   amLODIPine-benazepril (LOTREL) 5-20 MG per capsule TAKE 1 CAPSULE BY MOUTH EVERY DAY Yes Leona Sherman MD   tamsulosin (FLOMAX) 0.4 MG capsule Take 1 capsule by mouth daily Yes Leona Sherman MD   tiZANidine (ZANAFLEX) 4 MG tablet TAKE 1 TABLET BY MOUTH 3 TIMES DAILY AS NEEDED (MUSCLE SPASM) Yes Rola Flatness, APRN - NP   metoprolol succinate (TOPROL XL) 100 MG extended release tablet Take 1 tablet by mouth daily AT BEDTIME For heart and high blood pressure Yes Leona Sherman MD   atorvastatin (LIPITOR) 40 MG tablet Take 1 tablet by mouth daily For high cholesterol  Leona Sherman MD       [facility] (Including outside providers/suppliers regularly involved in providing care):   Patient Care Team:  Leona Sherman MD as PCP - General (Family Medicine)  Leona Sherman MD as PCP - [facility]  Fernando Richmond MD as Consulting Physician (Cardiology)  Ibrahima Watts MD as Consulting Physician (Neurosurgery)    Reviewed and updated this visit:  Tobacco  Allergies  Meds  Problems  Med Hx  Surg Hx  Soc Hx  Fam Hx

## 2022-03-21 NOTE — PATIENT INSTRUCTIONS
Wt Readings from Last 3 Encounters:   03/21/22 272 lb 12.8 oz (123.7 kg)   10/25/21 272 lb (123.4 kg)   09/23/21 270 lb (122.5 kg)       Personalized Preventive Plan for Sharon Cummings - 3/21/2022  Medicare offers a range of preventive health benefits. Some of the tests and screenings are paid in full while other may be subject to a deductible, co-insurance, and/or copay. Some of these benefits include a comprehensive review of your medical history including lifestyle, illnesses that may run in your family, and various assessments and screenings as appropriate. After reviewing your medical record and screening and assessments performed today your provider may have ordered immunizations, labs, imaging, and/or referrals for you. A list of these orders (if applicable) as well as your Preventive Care list are included within your After Visit Summary for your review. Other Preventive Recommendations:    · A preventive eye exam performed by an eye specialist is recommended every 1-2 years to screen for glaucoma; cataracts, macular degeneration, and other eye disorders. · A preventive dental visit is recommended every 6 months. · Try to get at least 150 minutes of exercise per week or 10,000 steps per day on a pedometer . · Order or download the FREE \"Exercise & Physical Activity: Your Everyday Guide\" from [employer]. Call 6-887.422.7405 or search [employer] online. · You need 0639-0724 mg of calcium and 4226-8386 IU of vitamin D per day. It is possible to meet your calcium requirement with diet alone, but a vitamin D supplement is usually necessary to meet this goal.  · When exposed to the sun, use a sunscreen that protects against both UVA and UVB radiation with an SPF of 30 or greater. Reapply every 2 to 3 hours or after sweating, drying off with a towel, or swimming. · Always wear a seat belt when traveling in a car.  Always wear a helmet when riding a bicycle or motorcycle. Patient information: Weight loss treatments    INTRODUCTION  Obesity is a major international problem, and Americans are among the heaviest people in the world. The percentage of obese people in the [place] has risen steadily. Many people find that although they initially lose weight by dieting, they quickly regain the weight after the diet ends. Because it so hard to keep weight off over time, it is important to have as much information and support as possible before starting a diet. You are most likely to be successful in losing weight and keeping it off when you believe that your body weight can be controlled. STARTING A WEIGHT LOSS PROGRAM  Some people like to talk to their doctor or nurse to get help choosing the best plan, monitoring progress, and getting advice and support along the way. To know what treatment (or combination of treatments) will work best, determine your body mass index (BMI) and waist circumference (measurement). The BMI is calculated from your height and weight. A person with a BMI between 25 and 29.9 is considered overweight   A person with a BMI of 30 or greater is considered to be obese  A waist circumference greater than 35 inches (88 cm) in women and 40 inches (102 cm) in men increases the risk of obesity-related complications, such as heart disease and diabetes. People who are obese and who have a larger waist size may need more aggressive weight loss treatment than others. Talk to your doctor or nurse for advice. Types of treatment  Based on your measurements and your medical history, your doctor or nurse can determine what combination of weight loss treatments would work best for you. Treatments may include changes in lifestyle, exercise, dieting, and, in some cases, weight loss medicines or weight loss surgery.  Weight loss surgery, also called bariatric surgery, is reserved for people with severe obesity who have not responded to other weight loss treatments. SETTING A WEIGHT LOSS GOAL  It is important to set a realistic weight loss goal. Your first goal should be to avoid gaining more weight and staying at your current weight (or within 5 percent). Many people have a \"dream\" weight that is difficult or impossible to achieve. People at high risk of developing diabetes who are able to lose 5 percent of their body weight and maintain this weight will reduce their risk of developing diabetes by about 50 percent and reduce their blood pressure. This is a success. Losing more than 15 percent of your body weight and staying at this weight is an extremely good result, even if you never reach your \"dream\" or \"ideal\" weight. LIFESTYLE CHANGES  Programs that help you to change your lifestyle are usually run by psychologists or other professionals. The goals of lifestyle changes are to help you change your eating habits, become more active, and be more aware of how much you eat and exercise, helping you to make healthier choices. This type of treatment can be broken down into three steps: The triggers that make you want to eat   Eating   What happens after you eat  Triggers to eat  Determining what triggers you to eat involves figuring out what foods you eat and where and when you eat. To figure out what triggers you to eat, keep a record for a few days of everything you eat, the places where you eat, how often you eat, and the emotions you were feeling when you ate. For some people, the trigger is related to a certain time of day or night. For others, the trigger is related to a certain place, like sitting at a desk working. Eating  You can change your eating habits by breaking the chain of events between the trigger for eating and eating itself. There are many ways to do this.  For instance, you can:  Limit where you eat to a few places (eg, dining room)   Restrict the number of utensils (eg, only a fork) used for eating   Drink a sip of water between each bite   Chew your food a certain number of times   Get up and stop eating every few minutes  What happens after you eat  Rewarding yourself for good eating behaviors can help you to develop better habits. This is not a reward for weight loss; instead, it is a reward for changing unhealthy behaviors. Do not use food as a reward. Some people find money, clothing, or personal care (eg, a hair cut, manicure, or massage) to be effective rewards. Treat yourself immediately after making better eating choices to reinforce the value of the good behavior. You need to have clear behavior goals, and you must have a time frame for reaching your goals. Reward small changes along the way to your final goal.  Other factors that contribute to successful weight loss  Changing your behavior involves more than just changing unhealthy eating habits; it also involves finding people around you to support your weight loss, reducing stress, and learning to be strong when tempted by food. Establish a \"rodolfo\" system  Having a friend or family member available to provide support and reinforce good behavior is very helpful. The support person needs to understand your goals. Learn to be strong  Learning to be strong when tempted by food is an important part of losing weight. As an example, you will need to learn how to say \"no\" and continue to say no when urged to eat at parties and social gatherings. Develop strategies for events before you go, such as eating before you go or taking low-calorie snacks and drinks with you. Develop a support system  Having a support system is helpful when losing weight. This is why many commercial groups are successful. Family support is also essential; if your family does not support your efforts to lose weight, this can slow your progress or even keep you from losing weight.    Positive thinking  People often have conversations with themselves in their head; these conversations can be positive or negative. If you eat a piece of cake that was not planned, you may respond by thinking, \"Oh, you stupid idiot, you've blown your diet! \" and as a result, you may eat more cake. A positive thought for the same event could be, \"Well, I ate cake when it was not on my plan. Now I should do something to get back on track. \" A positive approach is much more likely to be successful than a negative one. Reduce stress  Although stress is a part of everyday life, it can trigger uncontrolled eating in some people. It is important to find a way to get through these difficult times without eating or by eating low-calorie food, like raw vegetables. It may be helpful to imagine a relaxing place that allows you to temporarily escape from stress. With deep breaths and closed eyes, you can imagine this relaxing place for a few minutes. Self-help programs  Self-help programs like [employer] Watchers®, Overeaters Anonymous®, and Take Off [place] (32[employer] films)© work for some people. As with all weight loss programs, you are most likely to be successful with these plans if you make long-term changes in how you eat. CHOOSING A DIET  A calorie is a unit of energy found in food. Your body needs calories to function. The goal of any diet is to burn up more calories than you eat. How quickly you lose weight depends upon several factors, such as your age, gender, and starting weight. Older people have a slower metabolism than young people, so they lose weight more slowly. Men lose more weight than women of similar height and weight when dieting because they use more energy. People who are extremely overweight lose weight more quickly than those who are only mildly overweight. Try not to drink alcohol or drinks with added sugar, and most sweets (candy, cakes, cookies), since they rarely contain important nutrients.   Portion-controlled diets  One simple way to diet is to buy packaged foods, like frozen low-calorie meals or meal-replacement canned drinks. A typical meal plan for one day may include:  A meal-replacement drink or breakfast bar for breakfast   A meal-replacement drink or a frozen low-calorie (250 to 350 calories) meal for lunch   A frozen low-calorie meal or other prepackaged, calorie-controlled meal, along with extra vegetables for dinner  This would give you 1000 to 1500 calories per day. Low-fat diet  To reduce the amount of fat in your diet, you can:  Eat low-fat foods. Low-fat foods are those that contain less than 30 percent of calories from fat. Fat is listed on the food facts label (figure 1). Count fat grams. For a 1500 calorie diet, this would mean about 45 g or fewer of fat per day. Low-carbohydrate diet  Low- and very-low-carbohydrate diets (eg, Atkins diet, [employer]) have become popular ways to lose weight quickly. With a very-low-carbohydrate diet, you eat between 0 and 60 grams of carbohydrates per day (a standard diet contains 200 to 300 grams of carbohydrates)   With a low-carbohydrate diet, you eat between 60 and 130 grams of carbohydrates per day  Carbohydrates are found in fruits, vegetables, and grains (including breads, rice, pasta, and cereal), alcoholic beverages, and in dairy products. Meat and fish do not contain carbohydrates. Side effects of very-low-carbohydrate diets can include constipation, headache, bad breath, muscle cramps, diarrhea, and weakness. Mediterranean diet  The term \"Mediterranean diet\" refers to a way of eating that is common in [place] regions around [facility]. Although there is some variation in Mediterranean diets, there are some similarities.  Most Mediterranean diets include:  A high level of monounsaturated fats (from olive or canola oil, walnuts, pecans, almonds) and a low level of saturated fats (from butter)   A high amount of vegetables, fruits, legumes, and grains (7 to 10 servings of fruits and vegetables per day)   A moderate amount of milk and dairy products, mostly in the form of cheese. Use low-fat dairy products (skim milk, fat-free yogurt, low-fat cheese). A relatively low amount of red meat and meat products. Substitute fish or poultry for red meat. For those who drink alcohol, a modest amount (mainly as red wine) may help to protect against cardiovascular disease. A modest amount is up to one (4 ounce) glass per day for women and up to two glasses per day for men. Which diet is best?  Studies have compared different diets, including:  Very-low-carbohydrate (Atkins)   Macronutrient balance controlling glycemic load (Zone®)   Reduced-calorie (Weight Watchers®)   Very-low-fat (Ornish)  No one diet is \"best\" for weight loss. Any diet will help you to lose weight if you stick with the diet. Therefore, it is important to choose a diet that includes foods you like. Fad diets  Fad diets often promise quick weight loss (more than 1 to 2 pounds per week) and may claim that you do not need to exercise or give up favorite foods. Some fad diets cost a lot of money, because you have to pay for seminars or pills. Fad diets generally lack any scientific evidence that they are safe and effective, but instead rely on \"before\" and \"after\" photos or testimonials. Diets that sound too good to be true usually are. These plans are a waste of time and money and are not recommended. A doctor, nurse, or nutritionist can help you find a safe and effective way to lose weight and keep it off. WEIGHT LOSS [facility] a weight loss medicine may be helpful when used in combination with diet, exercise, and lifestyle changes. However, it is important to understand the risks and benefits of these medicines. It is also important to be realistic about your goal weight using a weight loss medicine; you may not reach your \"dream\" weight, but you may be able to reduce your risk of diabetes or heart disease.    Weight loss medicines may be recommended for people who have not been able to lose weight with diet and exercise who have a:  BMI of 30 or more    BMI between 27 and 29.9 and have other medical problems, such as diabetes, high cholesterol, or high blood pressure  Two weight loss medicines are approved in the [place] for long-term use. These are sibutramine and orlistat. Other weight loss medicines (phentermine, diethylpropion) are available but are only approved for short-term use (up to 12 weeks). Sibutramine  Sibutramine (Meridia®, Reductil®) is a medicine that reduces your appetite. In people who take the medicine for one year, the average weight loss is 10 percent of the initial body weight (5 percent more than those who took a placebo treatment). Side effects of sibutramine include insomnia, dry mouth, and constipation. Increases in blood pressure can occur. Therefore, blood pressure is usually monitored during treatment. There is no evidence that sibutramine causes heart or lung problems (like dexfenfluramine and fenfluramine (Phen/Fen)). However, experts agree that sibutramine should not used by people with coronary heart disease, heart failure, uncontrolled hypertension, stroke, irregular heart rhythms, or peripheral vascular disease (poor circulation in the legs). Orlistat  Orlistat (Xenical® 120 mg capsules) is a medicine that reduces the amount of fat your body absorbs from the foods you eat. A lower-dose version is now available without a prescription (Ritchie® 60 mg capsules) in many countries, including the [place]. The medicine is recommended three times per day, taken with a meal; you can skip a dose if you skip a meal or if the meal contains no fat. After one year of treatment with orlistat, the average weight loss is approximately 8 to 10 percent of initial body weight (4 percent more than in those who took a placebo). Cholesterol levels often improve, and blood pressure sometimes falls.  In people with diabetes, orlistat may help control blood sugar levels. Side effects occur in 15 to 10 percent of people and may include stomach cramps, gas, diarrhea, leakage of stool, or oily stools. These problems are more likely when you take orlistat with a high-fat meal (if more than 30 percent of calories in the meal are from fat). Side effects usually improve as you learn to avoid high-fat foods. Severe liver injury has been reported rarely in patients taking orlistat, but it is not known if orlistat caused the liver problems. Diet supplements  Diet supplements are widely used by people who are trying to lose weight, although the safety and efficacy of these supplements are often unproven. A few of the more common diet supplements are discussed below; none of these are recommended because they have not been studied carefully, and there is no proof they are safe or effective. Chitosan and wheat dextrin are ineffective for weight loss, and their use is not recommended. Ephedra, a compound related to ephedrine, is no longer available in the [place] due to safety concerns. Many nonprescription diet pills previously contained ephedra. Although some studies have shown that ephedra helps with weight loss, there can be serious side effects (psychiatric symptoms, palpitations, and stomach upset), including death. There are not enough data about the safety and efficacy of chromium, ginseng, glucomannan, green tea, hydroxycitric acid, L carnitine, psyllium, pyruvate supplements, Avery wort, and conjugated linoleic acid. Two supplements from [facility], [address] [place] (also known as the Alisa Morgan 15 pill) and Herbathin dietary supplement, have been shown to contain prescription drugs. Hoodia gordonii is a dietary supplement derived from a plant in [place]. It is not recommended because there is no proof that it is safe or effective.    Bitter orange (Citrus aurantium) can increase your heart rate and blood pressure and is not the most common being lap banding, gastric bypass, and gastric sleeve. Lap banding  Laparoscopic adjustable gastric banding (LAGB), or lap banding, is a surgery that uses an adjustable band around the opening to the stomach (figure 1). This reduces the amount of food that you can eat at one time. Lap banding is done through small incisions, with a laparoscope. The band can be adjusted after surgery, allowing you to eat more or less food. Adjustments to the size and tightness of the band are made by using a needle to add or remove fluid from a port (a small container under the skin that is connected to the band). Adding fluid to the band makes it tighter which restricts the amount of food you can eat and may help you to lose more weight. Lap banding is a popular choice because it is relatively simple to perform, can be adjusted or removed, and has a low risk of serious complications immediately after surgery. However, weight loss with the lap band depends on your ability to follow the program closely. You will need to prepare nutritious meals that [facility] with\" the band, not against it. For example, the lap band will not work well if you eat or drink a large amount of liquid calories (like ice cream). The band will not help you to feel full when you eat/drink liquid calories. Weight loss ranges from 45 to 75 percent after two years. As an example, a person who is 120 pounds overweight could expect to lose approximately 54 to 90 pounds in the two years after lap banding. Gastric bypass  Maeve-en-Y gastric bypass, also called gastric bypass, helps you to lose weight by reducing the amount of food you can eat and reducing the number of calories and nutrients you absorb from the food you eat. To perform gastric bypass, a surgeon creates a small stomach pouch by dividing the stomach and attaching it to the small intestine. This helps you to lose weight in two ways:   The smaller stomach can hold less food than before surgery. This causes you to feel full after eating a very small amount of food or liquid. Over time, the pouch might stretch, allowing you to eat more food. The body absorbs fewer calories, since food bypasses most of the stomach as well as the upper small intestine. This new arrangement seems to decrease your appetite and change how you break down foods by changing the release of various hormones. Gastric bypass can be performed as open surgery (through an incision on the abdomen) or laparoscopically, which uses smaller incisions and smaller instruments. Both the laparoscopic and open techniques have risks and benefits. You and your surgeon should work together to decide which surgery, if any, is right for you. Gastric bypass has a high success rate, and people lose an average of 62 to 68 percent of their excess body weight in the first year. Weight loss typically levels off after one to two years, with an overall excess weight loss between 50 and 75 percent. For a person who is 120 pounds overweight, an average of 60 to 90 pounds of weight loss would be expected. Gastric sleeve  Gastric sleeve, also known as sleeve gastrectomy, is a surgery that reduces the size of the stomach and makes it into a narrow tube (figure 3). The new stomach is much smaller and produces less of the hormone (ghrelin) that causes hunger, helping you feel satisfied with less food. Sleeve gastrectomy is safer than gastric bypass because the intestines are not rearranged, and there is less chance of malnutrition. It also appears to control hunger better than lap banding. It might be safer than the lap banding because no foreign materials are used. The gastric sleeve has a good success rate, and people lose an average of 33 percent of their excess body weight in the first year. For a person who is 120 pounds overweight, this would mean losing about 40 pounds in the first year.   WEIGHT LOSS SURGERY COMPLICATIONS  A variety of you.  Once you are home, it is important to eat and drink exactly what your doctor and dietitian recommend. You will see your doctor, nurse, and dietitian on a regular basis after surgery to monitor your health, diet, and weight loss. You will be able to slowly increase how much you eat over time, although it will always be important to:  Eat small, frequent meals and not skip meals   Chew your food slowly and completely   Avoid eating while \"distracted\" (such as eating while watching TV)   Stop eating when you feel full   Drink liquids at least 30 minutes before or after eating   Avoid foods high in fat or sugar   Take vitamin supplements, as recommended  It can take several months to learn to listen to your body so that you know when you are hungry and when you are full. You may dislike foods you previously loved, and you may begin to prefer new foods. This can be a frustrating process for some people, so talk to your dietitian if you are having trouble. It usually takes between one and two years to lose weight after surgery. After reaching their goal weight, some people have plastic surgery (called \"body contouring\") to remove excess skin from the body, particularly in the abdominal area. Before you decide to have weight loss surgery, you must commit to staying healthy for life. This includes following up with your healthcare team, exercising most days of the week, and eating a sensible diet every day. It can be difficult to develop new eating and exercise habits after weight loss surgery, and you will have to work hard to stick to your goals. Recovering from surgery and losing weight can be stressful and emotional, and it is important to have the support of family and friends. Working with a , therapist, or support group can help you through the ups and downs.   WHERE TO GET MORE INFORMATION  Your healthcare provider is the best source of information for questions and concerns related to your medical problem.   This article will be updated as needed every four months on our Web site ([email or web address])  ·

## 2022-03-23 RX ORDER — ATORVASTATIN CALCIUM 40 MG/1
40 TABLET, FILM COATED ORAL DAILY
Qty: 90 TABLET | Refills: 1 | Status: SHIPPED | OUTPATIENT
Start: 2022-03-23 | End: 2022-09-19

## 2022-04-03 NOTE — ASSESSMENT & PLAN NOTE
Wt Readings from Last 3 Encounters:   03/21/22 272 lb 12.8 oz (123.7 kg)   10/25/21 272 lb (123.4 kg)   09/23/21 270 lb (122.5 kg)   Stable but not controlled. Continue to work on diet and exercise.

## 2022-04-03 NOTE — ASSESSMENT & PLAN NOTE
Uncertain status until we get the results of his blood work. Continue atorvastatin 40 mg 1 daily. He is having no problems with this medication. We will refill when he needs it.

## 2022-04-03 NOTE — ASSESSMENT & PLAN NOTE
Controlled. Continue benazepril/amlodipine 20/five 1 capsule daily. We will refill when needed. He is also on indapamide 1.25 mg 1 daily and Toprol- mg 1 daily.   He does not need refills of these at the present time

## 2022-04-04 LAB — NONINV COLON CA DNA+OCC BLD SCRN STL QL: POSITIVE

## 2022-04-05 DIAGNOSIS — R19.5 POSITIVE COLORECTAL CANCER SCREENING USING COLOGUARD TEST: Primary | ICD-10-CM

## 2022-04-07 NOTE — PROGRESS NOTES
Subjective    Mr. Melo is 67 y.o. male    Chief Complaint: Elevated PSA    History of Present Illness    67-year-old male established patient follow-up for worsening elevated PSA.  He had benign prostate biopsy April 2021 for PSA of 4.3.  His TRUS volume at that time was 48 cc. His most recent PSA is slightly up to 5.1 from 4.7.   LUTS are becoming more bothersome on tamsulosin.  He also has new problem of gradual worsening erectile dysfunction with difficulty maintaining rigidity. He denies a family history of prostate cancer.  No hematuria or dysuria.  UA today is clear.    Lab Results   Component Value Date    PSA 5.15 (H) 03/21/2022    PSA 4.7 (H) 10/19/2021    PSA 4.36 (H) 03/17/2021       The following portions of the patient's history were reviewed and updated as appropriate: allergies, current medications, past family history, past medical history, past social history, past surgical history and problem list.    Review of Systems      Current Outpatient Medications:   •  amLODIPine-benazepril (LOTREL 5-20) 5-20 MG per capsule, Take 1 capsule by mouth., Disp: , Rfl:   •  atorvastatin (LIPITOR) 40 MG tablet, TAKE 1 TABLET BY MOUTH DAILY FOR HIGH CHOLESTEROL, Disp: , Rfl:   •  levoFLOXacin (LEVAQUIN) 500 MG tablet, 1 tab by mouth the evening prior to prostate biopsy, Disp: 1 tablet, Rfl: 0  •  metoprolol succinate XL (TOPROL-XL) 50 MG 24 hr tablet, Take 50 mg by mouth Daily., Disp: , Rfl:   •  sodium phosphate (FLEET) 7-19 GM/118ML enema, Use rectally the evening prior to morning of prostate biopsy, Disp: 1 enema, Rfl: 0  •  tamsulosin (FLOMAX) 0.4 MG capsule 24 hr capsule, Take 1 capsule by mouth Every Night., Disp: 90 capsule, Rfl: 3  •  finasteride (PROSCAR) 5 MG tablet, Take 1 tablet by mouth Daily., Disp: 90 tablet, Rfl: 3  •  tadalafil (Cialis) 20 MG tablet, 1/2 tab by mouth 1 hour prior to intercourse, Disp: 30 tablet, Rfl: 5    Past Medical History:   Diagnosis Date   • High cholesterol    •  "Hypertension        History reviewed. No pertinent surgical history.    Social History     Socioeconomic History   • Marital status: Unknown   Tobacco Use   • Smoking status: Never Smoker   • Smokeless tobacco: Never Used   Vaping Use   • Vaping Use: Never used   Substance and Sexual Activity   • Alcohol use: Never   • Drug use: Never   • Sexual activity: Defer       Family History   Problem Relation Age of Onset   • No Known Problems Father    • No Known Problems Mother        Objective    Temp 97.8 °F (36.6 °C)   Ht 175.3 cm (69\")   Wt 122 kg (270 lb)   BMI 39.87 kg/m²     Physical Exam  Penis and testicles are normal.  Digital rectal exam by me reveals 50 to 60 g prostate, smooth, no nodules.      Results for orders placed or performed in visit on 04/14/22   POC Urinalysis Dipstick, Multipro    Specimen: Urine   Result Value Ref Range    Color Yellow Yellow, Straw, Dark Yellow, Jennifer    Clarity, UA Clear Clear    Glucose, UA Negative Negative, 1000 mg/dL (3+) mg/dL    Bilirubin Negative Negative    Ketones, UA Negative Negative    Specific Gravity  1.020 1.005 - 1.030    Blood, UA Negative Negative    pH, Urine 6.0 5.0 - 8.0    Protein, POC Negative Negative mg/dL    Urobilinogen, UA Normal Normal    Nitrite, UA Negative Negative    Leukocytes Negative Negative     Assessment and Plan    Diagnoses and all orders for this visit:    1. Elevated PSA (Primary)  -     POC Urinalysis Dipstick, Multipro  -     PSA DIAGNOSTIC; Future    2. Erectile dysfunction due to arterial insufficiency    3. Urine frequency    Other orders  -     finasteride (PROSCAR) 5 MG tablet; Take 1 tablet by mouth Daily.  Dispense: 90 tablet; Refill: 3  -     tamsulosin (FLOMAX) 0.4 MG capsule 24 hr capsule; Take 1 capsule by mouth Every Night.  Dispense: 90 capsule; Refill: 3  -     tadalafil (Cialis) 20 MG tablet; 1/2 tab by mouth 1 hour prior to intercourse  Dispense: 30 tablet; Refill: 5      PSA remains elevated but is commensurate with " his enlarged prostate.  We discussed options for repeat biopsy, prostate MRI, or adding finasteride to tamsulosin for combination therapy.  Given his worsening bothersome LUTS on alpha-blocker, he would like to begin finasteride.  I recommended PDE inhibitor therapy for CAD.  He will follow-up with me in my bin clinic in 6 months with pre-clinic PSA or sooner as needed.      This document has been signed by BREANN Hernandez MD on April 14, 2022 14:43 CDT

## 2022-04-14 ENCOUNTER — TELEPHONE (OUTPATIENT)
Dept: GASTROENTEROLOGY | Age: 67
End: 2022-04-14

## 2022-04-14 ENCOUNTER — OFFICE VISIT (OUTPATIENT)
Dept: UROLOGY | Facility: CLINIC | Age: 67
End: 2022-04-14

## 2022-04-14 VITALS — HEIGHT: 69 IN | TEMPERATURE: 97.8 F | BODY MASS INDEX: 39.99 KG/M2 | WEIGHT: 270 LBS

## 2022-04-14 DIAGNOSIS — R97.20 ELEVATED PSA: Primary | ICD-10-CM

## 2022-04-14 DIAGNOSIS — N52.01 ERECTILE DYSFUNCTION DUE TO ARTERIAL INSUFFICIENCY: ICD-10-CM

## 2022-04-14 DIAGNOSIS — R35.0 URINE FREQUENCY: ICD-10-CM

## 2022-04-14 LAB
BILIRUB BLD-MCNC: NEGATIVE MG/DL
CLARITY, POC: CLEAR
COLOR UR: YELLOW
GLUCOSE UR STRIP-MCNC: NEGATIVE MG/DL
KETONES UR QL: NEGATIVE
LEUKOCYTE EST, POC: NEGATIVE
NITRITE UR-MCNC: NEGATIVE MG/ML
PH UR: 6 [PH] (ref 5–8)
PROT UR STRIP-MCNC: NEGATIVE MG/DL
RBC # UR STRIP: NEGATIVE /UL
SP GR UR: 1.02 (ref 1–1.03)
UROBILINOGEN UR QL: NORMAL

## 2022-04-14 PROCEDURE — 99214 OFFICE O/P EST MOD 30 MIN: CPT | Performed by: UROLOGY

## 2022-04-14 PROCEDURE — 81001 URINALYSIS AUTO W/SCOPE: CPT | Performed by: UROLOGY

## 2022-04-14 RX ORDER — TAMSULOSIN HYDROCHLORIDE 0.4 MG/1
1 CAPSULE ORAL NIGHTLY
Qty: 90 CAPSULE | Refills: 3 | Status: SHIPPED | OUTPATIENT
Start: 2022-04-14

## 2022-04-14 RX ORDER — TADALAFIL 20 MG/1
TABLET ORAL
Qty: 30 TABLET | Refills: 5 | Status: SHIPPED | OUTPATIENT
Start: 2022-04-14

## 2022-04-14 RX ORDER — ATORVASTATIN CALCIUM 40 MG/1
TABLET, FILM COATED ORAL
COMMUNITY
Start: 2022-03-23

## 2022-04-14 RX ORDER — FINASTERIDE 5 MG/1
5 TABLET, FILM COATED ORAL DAILY
Qty: 90 TABLET | Refills: 3 | Status: SHIPPED | OUTPATIENT
Start: 2022-04-14

## 2022-04-14 NOTE — TELEPHONE ENCOUNTER
Bed: 06  Expected date:   Expected time:   Means of arrival:   Comments:  E[facility] GI     Florence Kapoor RN  10/16/18 7399 Message sent to  at .  Riky sharif

## 2022-04-22 ENCOUNTER — HOSPITAL ENCOUNTER (OUTPATIENT)
Age: 67
Setting detail: OUTPATIENT SURGERY
Discharge: HOME | End: 2022-04-22
Attending: INTERNAL MEDICINE | Admitting: INTERNAL MEDICINE
Payer: MEDICARE

## 2022-04-22 ENCOUNTER — ANESTHESIA (OUTPATIENT)
Dept: ENDOSCOPY | Age: 67
End: 2022-04-22
Payer: MEDICARE

## 2022-04-22 ENCOUNTER — ANESTHESIA EVENT (OUTPATIENT)
Dept: ENDOSCOPY | Age: 67
End: 2022-04-22
Payer: MEDICARE

## 2022-04-22 VITALS
TEMPERATURE: 97.5 F | BODY MASS INDEX: 40.29 KG/M2 | OXYGEN SATURATION: 93 % | RESPIRATION RATE: 20 BRPM | SYSTOLIC BLOOD PRESSURE: 126 MMHG | HEART RATE: 57 BPM | HEIGHT: 69 IN | DIASTOLIC BLOOD PRESSURE: 62 MMHG | WEIGHT: 272 LBS

## 2022-04-22 VITALS
TEMPERATURE: 98.3 F | DIASTOLIC BLOOD PRESSURE: 92 MMHG | OXYGEN SATURATION: 94 % | SYSTOLIC BLOOD PRESSURE: 124 MMHG | RESPIRATION RATE: 12 BRPM

## 2022-04-22 PROCEDURE — 45378 DIAGNOSTIC COLONOSCOPY: CPT | Performed by: INTERNAL MEDICINE

## 2022-04-22 PROCEDURE — 2500000003 HC RX 250 WO HCPCS: Performed by: REGISTERED NURSE

## 2022-04-22 PROCEDURE — 7100000010 HC PHASE II RECOVERY - FIRST 15 MIN: Performed by: INTERNAL MEDICINE

## 2022-04-22 PROCEDURE — 7100000011 HC PHASE II RECOVERY - ADDTL 15 MIN: Performed by: INTERNAL MEDICINE

## 2022-04-22 PROCEDURE — 2580000003 HC RX 258: Performed by: INTERNAL MEDICINE

## 2022-04-22 PROCEDURE — 6360000002 HC RX W HCPCS: Performed by: REGISTERED NURSE

## 2022-04-22 PROCEDURE — 2709999900 HC NON-CHARGEABLE SUPPLY: Performed by: INTERNAL MEDICINE

## 2022-04-22 PROCEDURE — 3700000000 HC ANESTHESIA ATTENDED CARE: Performed by: INTERNAL MEDICINE

## 2022-04-22 PROCEDURE — 3609027000 HC COLONOSCOPY: Performed by: INTERNAL MEDICINE

## 2022-04-22 PROCEDURE — 3700000001 HC ADD 15 MINUTES (ANESTHESIA): Performed by: INTERNAL MEDICINE

## 2022-04-22 RX ORDER — PROPOFOL 10 MG/ML
INJECTION, EMULSION INTRAVENOUS PRN
Status: DISCONTINUED | OUTPATIENT
Start: 2022-04-22 | End: 2022-04-22 | Stop reason: SDUPTHER

## 2022-04-22 RX ORDER — LIDOCAINE HYDROCHLORIDE 10 MG/ML
INJECTION, SOLUTION EPIDURAL; INFILTRATION; INTRACAUDAL; PERINEURAL PRN
Status: DISCONTINUED | OUTPATIENT
Start: 2022-04-22 | End: 2022-04-22 | Stop reason: SDUPTHER

## 2022-04-22 RX ORDER — SODIUM CHLORIDE, SODIUM LACTATE, POTASSIUM CHLORIDE, CALCIUM CHLORIDE 600; 310; 30; 20 MG/100ML; MG/100ML; MG/100ML; MG/100ML
INJECTION, SOLUTION INTRAVENOUS CONTINUOUS
Status: DISCONTINUED | OUTPATIENT
Start: 2022-04-22 | End: 2022-04-22 | Stop reason: HOSPADM

## 2022-04-22 RX ORDER — FENTANYL CITRATE 50 UG/ML
INJECTION, SOLUTION INTRAMUSCULAR; INTRAVENOUS PRN
Status: DISCONTINUED | OUTPATIENT
Start: 2022-04-22 | End: 2022-04-22 | Stop reason: SDUPTHER

## 2022-04-22 RX ADMIN — LIDOCAINE HYDROCHLORIDE 100 MG: 10 INJECTION, SOLUTION EPIDURAL; INFILTRATION; INTRACAUDAL; PERINEURAL at 12:53

## 2022-04-22 RX ADMIN — FENTANYL CITRATE 50 MCG: 50 INJECTION INTRAMUSCULAR; INTRAVENOUS at 12:47

## 2022-04-22 RX ADMIN — FENTANYL CITRATE 50 MCG: 50 INJECTION INTRAMUSCULAR; INTRAVENOUS at 12:53

## 2022-04-22 RX ADMIN — SODIUM CHLORIDE, POTASSIUM CHLORIDE, SODIUM LACTATE AND CALCIUM CHLORIDE: 600; 310; 30; 20 INJECTION, SOLUTION INTRAVENOUS at 12:46

## 2022-04-22 RX ADMIN — PROPOFOL 200 MG: 10 INJECTION, EMULSION INTRAVENOUS at 12:53

## 2022-04-22 ASSESSMENT — PAIN - FUNCTIONAL ASSESSMENT: PAIN_FUNCTIONAL_ASSESSMENT: 0-10

## 2022-04-22 ASSESSMENT — PAIN SCALES - GENERAL
PAINLEVEL_OUTOF10: 0

## 2022-04-22 ASSESSMENT — LIFESTYLE VARIABLES: SMOKING_STATUS: 0

## 2022-04-22 NOTE — ANESTHESIA POSTPROCEDURE EVALUATION
Department of Anesthesiology  Postprocedure Note    Patient: Lake Gerard  MRN: [id number]  YOB: 1955  Date of evaluation: 4/22/2022  Time:  1:14 PM     Procedure Summary     Date: 04/22/22 Room / Location: [address]    Anesthesia Start: 1247 Anesthesia Stop: 0448    Procedure: COLORECTAL CANCER SCREENING, NOT HIGH RISK (N/A ) Diagnosis: (SCREEN)    Surgeons: Monique Lane MD Responsible Provider: DIONE Zuñiga CRNA    Anesthesia Type: general ASA Status: 3          Anesthesia Type: general    Rosaura Phase I: Rosaura Score: 10    Rosauar Phase II:      Last vitals: Reviewed and per EMR flowsheets.        Anesthesia Post Evaluation    Patient location during evaluation: PACU  Patient participation: complete - patient participated  Level of consciousness: sleepy but conscious  Pain score: 0  Airway patency: patent  Nausea & Vomiting: no nausea and no vomiting  Complications: no  Cardiovascular status: hemodynamically stable  Respiratory status: acceptable and room air  Hydration status: euvolemic

## 2022-04-22 NOTE — OP NOTE
Patient: Waylon Wiley : 1955  Med Rec#: [id number] Acc#: [id number]   Primary Care Provider Arianne Allison MD    Date of Procedure:  2022    Endoscopist: Phong Tsai MD, MD    Referring Provider: Arianne lAlison MD,     Operation Performed: Colonoscopy to the cecum    Indications:  Positive Cologuard test; needs colorectal cancer screening    Anesthesia:  Sedation was administered by anesthesia who monitored the patient during the procedure. I met with Waylon Wiley prior to procedure. We discussed the procedure itself, and I have discussed the risks of endoscopy (including-- but not limited to-- pain, discomfort, bleeding potentially requiring second endoscopic procedure and/or blood transfusion, organ perforation requiring operative repair, damage to organs near the colon, infection, aspiration, cardiopulmonary/allergic reaction), benefits, indications to endoscopy. Additionally, we discussed options other than colonoscopy. The patient expressed understanding. All questions answered. The patient decided to proceed with the procedure. Signed informed consent was placed on the chart. Blood Loss: minimal    Withdrawal time: More than 6 minutes  Bowel Prep:Fair  with small amounts of thick solid and semisolid stool and moderate amount of thick, opaque liquid scattered in patchy segments throughout the colon obscuring the underlying mucosa. Lesions including polyps may have been missed. Complications: no immediate complications    DESCRIPTION OF PROCEDURE:     A time out was performed. After written informed consent was obtained, the patient was placed in the left lateral position. The perianal area was inspected, and a digital rectal exam was performed. A rectal exam was performed: normal tone, no palpable lesions.  At this point, a forward viewing Olympus colonoscope was inserted into the anus and carefully advanced to the cecum with some difficulty requiring external abdominal pressure during parts of this procedure. The cecum was identified by the ileocecal valve and the appendiceal orifice. The colonoscope was then slowly withdrawn with careful inspection of the mucosa in a linear and circumferential fashion. The scope was retroflexed in the rectum. Suction was utilized during the procedure to remove as much air as possible from the bowel. The colonoscope was removed from the patient, and the procedure was terminated. Findings are listed below. Findings:     NO large polyps or masses or strictures or colitis. Suboptimal exam due to prep quality as described above. Internal hemorrhoids-Grade 1  Where it was clearly visible, the mucosa appeared normal throughout the entire examined colon  Retroflexion in the rectum was otherwise normal and revealed no further abnormalities     Recommendations:  1. Repeat colonoscopy: In 3 years for colorectal cancer screen due to his positive Cologuard test and suboptimal prep quality today; sooner if  the patient were to develop lower GI symptoms such as bleeding, abdominal pain, change in bowel habits or stool caliber or if the patient has anemia or unexplained weight loss in the future. 2. - Resume previous meds and diet  - GI clinic f/u PRN   - Keep scheduled f/u appts with other MDs     Findings and recommendations were discussed w/ the patient. A copy of the images was provided.     Ty Dennis MD, MD  4/22/2022  12:54 PM

## 2022-04-22 NOTE — H&P
Patient Name: Gigi Garcia  : 1955  MRN: [id number]  DATE: 22    Allergies: No Known Allergies     ENDOSCOPY  History and Physical    Procedure:    [] Diagnostic Colonoscopy       [x] Screening Colonoscopy  [] EGD      [] ERCP      [] EUS       [] Other    [x] Previous office notes/History and Physical reviewed from the patients chart. Please see EMR for further details of HPI. I have examined the patient's status immediately prior to the procedure and:      Indications/HPI: Positive Cologuard test; needs colorectal cancer screening  []Abdominal Pain   []Cancer- GI/Lung     []Fhx of colon CA/polyps  []History of Polyps  []Barretts            []Melena  []Abnormal Imaging              []Dysphagia              []Persistent Pneumonia   []Anemia                            []Food Impaction        []History of Polyps  [] GI Bleed             []Pulmonary nodule/Mass   []Change in bowel habits []Heartburn/Reflux  []Rectal Bleed (BRBPR)  []Chest Pain - Non Cardiac []Heme (+) Stool []Ulcers  []Constipation  []Hemoptysis  []Varices  []Diarrhea  []Hypoxemia    []Nausea/Vomiting   []Screening   []Crohns/Colitis  []Other:     Anesthesia:   [x] MAC [] Moderate Sedation   [] General   [] None     ROS: 12 pt Review of Symptoms was negative unless mentioned above    Medications:   Prior to Admission medications    Medication Sig Start Date End Date Taking?  Authorizing Provider   atorvastatin (LIPITOR) 40 MG tablet Take 1 tablet by mouth daily For high cholesterol 3/23/22   Jerry Carey MD   indapamide (LOZOL) 1.25 MG tablet TAKE 1 TABLET BY MOUTH EVERY MORNING FOR HIGH BLOOD PRESSURE 22   Jerry Carey MD   amLODIPine-benazepril (LOTREL) 5-20 MG per capsule TAKE 1 CAPSULE BY MOUTH EVERY DAY 21   Jerry Carey MD   tamsulosin (FLOMAX) 0.4 MG capsule Take 1 capsule by mouth daily 8/3/21   Jerry Carey MD   tiZANidine (ZANAFLEX) 4 MG tablet TAKE 1 TABLET BY MOUTH 3 TIMES DAILY AS NEEDED (MUSCLE SPASM) 6/8/21   Margaret Harmon, APRN - NP   metoprolol succinate (TOPROL XL) 100 MG extended release tablet Take 1 tablet by mouth daily AT BEDTIME For heart and high blood pressure 3/17/21   Amando Quiroga MD       Past Medical History:  Past Medical History:   Diagnosis Date    Borderline diabetes     Cancer ([address]     skin    Chronic back pain     Hyperlipidemia     Hypertension     Obesity        Past Surgical History:  Past Surgical History:   Procedure Laterality Date    ACHILLES TENDON SURGERY Left     COLONOSCOPY  12/16/15    Dr Roberta Ochoa at [facility], normal   [address] Bilateral     lens replaced    LUMBAR SPINE SURGERY Right 7/27/2021    RIGHT L3-4 METRIX MICRO DISCECTOMY performed by Angella Rosas MD at [address] [place] [address] PRE-MALIGNANT / [address]  2017    RETINAL DETACHMENT SURGERY Left 10/14    RETINAL DETACHMENT SURGERY Left 07/2018    VASCULAR SURGERY  11/4/15 [facility] & [address]    Aortorenal arteriogram with oblique views of the renal arteries. Social History:  Social History     Tobacco Use    Smoking status: Never Smoker    Smokeless tobacco: Never Used   Vaping Use    Vaping Use: Never used   Substance Use Topics    Alcohol use: No    Drug use: No       Vital Signs:   Vitals:    04/22/22 1236   BP: (!) 159/92   Pulse: 80   Resp: 20   Temp: 97.7 °F (36.5 °C)   SpO2: 94%        Physical Exam:  Cardiac:  [x]WNL  []Comments:  Pulmonary:  [x]WNL   []Comments:  Neuro/Mental Status:  [x]WNL  []Comments:  Abdominal:  [x]WNL    []Comments:  Other:   []WNL  []Comments:    Informed Consent:  The risks and benefits of the procedure have been discussed with either the patient or if they cannot consent, their representative. Assessment:  Patient examined and appropriate for planned sedation and procedure. Plan:  Proceed with planned sedation and procedure as above.          Maribel Conway MD

## 2022-04-22 NOTE — ANESTHESIA PRE PROCEDURE
Department of Anesthesiology  Preprocedure Note       Name:  Edmund Vasquez   Age:  69 y.o.  :  1955                                          MRN:  [id number]         Date:  2022      Surgeon: Neel Guerrero):  Lissa Monk MD    Procedure: Procedure(s):  COLORECTAL CANCER SCREENING, NOT HIGH RISK    Medications prior to admission:   Prior to Admission medications    Medication Sig Start Date End Date Taking? Authorizing Provider   atorvastatin (LIPITOR) 40 MG tablet Take 1 tablet by mouth daily For high cholesterol 3/23/22   Joe Rizzo MD   indapamide (LOZOL) 1.25 MG tablet TAKE 1 TABLET BY MOUTH EVERY MORNING FOR HIGH BLOOD PRESSURE 22   Joe Rizzo MD   amLODIPine-benazepril (LOTREL) 5-20 MG per capsule TAKE 1 CAPSULE BY MOUTH EVERY DAY 21   Joe Rizzo MD   tamsulosin (FLOMAX) 0.4 MG capsule Take 1 capsule by mouth daily 8/3/21   Joe Rizzo MD   tiZANidine (ZANAFLEX) 4 MG tablet TAKE 1 TABLET BY MOUTH 3 TIMES DAILY AS NEEDED (MUSCLE SPASM) 21   DIONE Gastelum NP   metoprolol succinate (TOPROL XL) 100 MG extended release tablet Take 1 tablet by mouth daily AT BEDTIME For heart and high blood pressure 3/17/21   Joe Rizzo MD       Current medications:    No current outpatient medications on file. No current facility-administered medications for this visit.        Allergies:  No Known Allergies    Problem List:    Patient Active Problem List   Diagnosis Code    Hyperlipidemia E78.5    Hypertension I10    Obesity, Class III, BMI 40-49.9 (morbid obesity) ([facility]) E66.01    Lumbar disc herniation with radiculopathy M51.16    Lumbar spondylosis M47.816    S/P lumbar discectomy Z98.890       Past Medical History:        Diagnosis Date    Borderline diabetes     Cancer ([facility] [address]     skin    Chronic back pain     Hyperlipidemia     Hypertension     Obesity        Past Surgical History:        Procedure Laterality Date    ACHILLES TENDON SURGERY      COLONOSCOPY  12/16/15    Dr Balwinder Esparza at [facility], normal   [address] Bilateral     lens replaced    LUMBAR SPINE SURGERY Right 7/27/2021    RIGHT L3-4 METRIX MICRO DISCECTOMY performed by Laxmi Duke MD at [facility] PRE-[facility]AL[facility] / BENIGN SKIN LESION EXCISION  2017    RETINAL DETACHMENT SURGERY Left 10/14    RETINAL DETACHMENT SURGERY Left 07/2018    VASCULAR SURGERY  11/4/15 [facility] & [address]    Aortorenal arteriogram with oblique views of the renal arteries. Social History:    Social History     Tobacco Use    Smoking status: Never Smoker    Smokeless tobacco: Never Used   Substance Use Topics    Alcohol use: No                                Counseling given: Not Answered      Vital Signs (Current): There were no vitals filed for this visit. BP Readings from Last 3 Encounters:   03/21/22 128/86   10/25/21 (!) 149/77   09/23/21 139/84       NPO Status:                                                                                 BMI:   Wt Readings from Last 3 Encounters:   03/21/22 272 lb 12.8 oz (123.7 kg)   10/25/21 272 lb (123.4 kg)   09/23/21 270 lb (122.5 kg)     There is no height or weight on file to calculate BMI.    CBC:   Lab Results   Component Value Date    WBC 6.4 07/23/2021    RBC 4.79 07/23/2021    HGB 15.4 07/23/2021    HCT 44.2 07/23/2021    MCV 92.3 07/23/2021    RDW 13.2 07/23/2021     07/23/2021       CMP:   Lab Results   Component Value Date     03/21/2022    K 3.2 03/21/2022     03/21/2022    CO2 28 03/21/2022    BUN 17 03/21/2022    CREATININE 0.9 03/21/2022    GFRAA >59 03/21/2022    LABGLOM >60 03/21/2022    GLUCOSE 123 03/21/2022    PROT 6.9 03/21/2022    CALCIUM 9.1 03/21/2022    BILITOT 0.7 03/21/2022    ALKPHOS 68 03/21/2022    AST 21 03/21/2022    ALT 20 03/21/2022       POC Tests: No results for input(s): POCGLU, POCNA, POCK, POCCL, POCBUN, POCHEMO, POCHCT in the last 72 hours.     Coags: No results found for: PROTIME, INR, APTT    HCG (If Applicable): No results found for: PREGTESTUR, PREGSERUM, HCG, HCGQUANT     ABGs: No results found for: PHART, PO2ART, KJP6RVY, RVS3WJU, BEART, C4KVBCZB     Type & Screen (If Applicable):  No results found for: LABABO, LABRH    Drug/Infectious Status (If Applicable):  No results found for: HIV, HEPCAB    COVID-19 Screening (If Applicable): No results found for: COVID19        Anesthesia Evaluation  Patient summary reviewed no history of anesthetic complications:   Airway: Mallampati: III  TM distance: >3 FB   Neck ROM: full  Mouth opening: > = 3 FB Dental:          Pulmonary:normal exam  breath sounds clear to auscultation      (-) asthma, recent URI, sleep apnea and not a current smoker          Patient did not smoke on day of surgery. Cardiovascular:  Exercise tolerance: good (>4 METS),   (+) hypertension:,     (-) pacemaker, past MI, CABG/stent and  angina    ECG reviewed  Rhythm: regular  Rate: normal           Beta Blocker:  Dose within 24 Hrs         Neuro/Psych:      (-) seizures, TIA and CVA           GI/Hepatic/Renal:   (+) renal disease: kidney stones, morbid obesity     (-) GERD and liver disease       Endo/Other:        (-) diabetes mellitus, hypothyroidism, hyperthyroidism               Abdominal:   (+) obese,           Vascular: Other Findings:               Anesthesia Plan      general     ASA 3     (Preop famotidine, dexamethasone)  Induction: intravenous. MIPS: Postoperative opioids intended and Prophylactic antiemetics administered. Anesthetic plan and risks discussed with patient. Use of blood products discussed with patient whom consented to blood products.                    DIONE Camacho - CRNA   4/22/2022

## 2022-05-04 RX ORDER — METOPROLOL SUCCINATE 100 MG/1
100 TABLET, EXTENDED RELEASE ORAL DAILY
Qty: 90 TABLET | Refills: 3 | Status: SHIPPED | OUTPATIENT
Start: 2022-05-04

## 2022-09-19 RX ORDER — ATORVASTATIN CALCIUM 40 MG/1
40 TABLET, FILM COATED ORAL DAILY
Qty: 90 TABLET | Refills: 1 | Status: SHIPPED | OUTPATIENT
Start: 2022-09-19

## 2022-10-03 ENCOUNTER — NURSE ONLY (OUTPATIENT)
Dept: PRIMARY CARE CLINIC | Age: 67
End: 2022-10-03

## 2022-10-03 DIAGNOSIS — R73.09 OTHER ABNORMAL GLUCOSE: ICD-10-CM

## 2022-10-03 DIAGNOSIS — Z12.5 ENCOUNTER FOR SCREENING FOR MALIGNANT NEOPLASM OF PROSTATE: ICD-10-CM

## 2022-10-03 DIAGNOSIS — R73.09 ELEVATED GLUCOSE: ICD-10-CM

## 2022-10-03 DIAGNOSIS — I10 PRIMARY HYPERTENSION: ICD-10-CM

## 2022-10-03 DIAGNOSIS — R97.20 ELEVATED PSA: ICD-10-CM

## 2022-10-03 DIAGNOSIS — R97.20 ELEVATED PSA: Primary | ICD-10-CM

## 2022-10-03 DIAGNOSIS — E78.00 HYPERCHOLESTEROLEMIA: ICD-10-CM

## 2022-10-03 DIAGNOSIS — E78.2 MIXED HYPERLIPIDEMIA: Primary | ICD-10-CM

## 2022-10-03 LAB
ANION GAP SERPL CALCULATED.3IONS-SCNC: 15 MMOL/L (ref 7–19)
BUN BLDV-MCNC: 30 MG/DL (ref 8–23)
CALCIUM SERPL-MCNC: 9.5 MG/DL (ref 8.8–10.2)
CHLORIDE BLD-SCNC: 103 MMOL/L (ref 98–111)
CO2: 28 MMOL/L (ref 22–29)
CREAT SERPL-MCNC: 1.4 MG/DL (ref 0.5–1.2)
GFR AFRICAN AMERICAN: >59
GFR NON-AFRICAN AMERICAN: 50
GLUCOSE BLD-MCNC: 107 MG/DL (ref 74–109)
HBA1C MFR BLD: 6.3 % (ref 4–6)
POTASSIUM SERPL-SCNC: 3.2 MMOL/L (ref 3.5–5)
PROSTATE SPECIFIC ANTIGEN: 3.29 NG/ML (ref 0–4)
SODIUM BLD-SCNC: 146 MMOL/L (ref 136–145)

## 2022-10-13 ENCOUNTER — TELEPHONE (OUTPATIENT)
Dept: PRIMARY CARE CLINIC | Age: 67
End: 2022-10-13

## 2022-10-13 NOTE — TELEPHONE ENCOUNTER
Chart audit shows patient had a positive (abnormal) Cologuard test completed 3/30/22. Audit shows results were entered correctly, ordering physician/APC reviewed and follow up plan in place.

## 2022-10-14 NOTE — PROGRESS NOTES
Subjective    Mr. Melo is 67 y.o. male    Chief Complaint: Elevated PSA    History of Present Illness    67-year-old male established patient follow-up for worsening elevated PSA, enlarged prostate, and ED.  He had benign prostate biopsy April 2021 for PSA of 4.3.  His TRUS volume at that time was 48 cc.  He was placed on finasteride earlier this year but his PSA only fell to 3.29 from previous level of 5.15.  We discussed that his PSA would adjust to 6.6 for finasteride effect.  He also takes tamsulosin as well as tadalafil for ED.  He had multiple questions today regarding his hemoglobin A1c level and serum glucose.  No hematuria or dysuria.  UA today is clear    Lab Results   Component Value Date    PSA 3.29 10/03/2022    PSA 5.15 (H) 03/21/2022    PSA 4.7 (H) 10/19/2021       The following portions of the patient's history were reviewed and updated as appropriate: allergies, current medications, past family history, past medical history, past social history, past surgical history and problem list.    Review of Systems      Current Outpatient Medications:   •  amLODIPine-benazepril (LOTREL 5-20) 5-20 MG per capsule, Take 1 capsule by mouth., Disp: , Rfl:   •  atorvastatin (LIPITOR) 40 MG tablet, TAKE 1 TABLET BY MOUTH DAILY FOR HIGH CHOLESTEROL, Disp: , Rfl:   •  finasteride (PROSCAR) 5 MG tablet, Take 1 tablet by mouth Daily., Disp: 90 tablet, Rfl: 3  •  levoFLOXacin (LEVAQUIN) 500 MG tablet, 1 tab by mouth the evening prior to prostate biopsy, Disp: 1 tablet, Rfl: 0  •  metoprolol succinate XL (TOPROL-XL) 50 MG 24 hr tablet, Take 50 mg by mouth Daily., Disp: , Rfl:   •  sodium phosphate (FLEET) 7-19 GM/118ML enema, Use rectally the evening prior to morning of prostate biopsy, Disp: 1 enema, Rfl: 0  •  tadalafil (Cialis) 20 MG tablet, 1/2 tab by mouth 1 hour prior to intercourse, Disp: 30 tablet, Rfl: 5  •  tamsulosin (FLOMAX) 0.4 MG capsule 24 hr capsule, Take 1 capsule by mouth Every Night., Disp: 90 capsule,  "Rfl: 3    Past Medical History:   Diagnosis Date   • High cholesterol    • Hypertension        History reviewed. No pertinent surgical history.    Social History     Socioeconomic History   • Marital status: Unknown   Tobacco Use   • Smoking status: Never   • Smokeless tobacco: Never   Vaping Use   • Vaping Use: Never used   Substance and Sexual Activity   • Alcohol use: Never   • Drug use: Never   • Sexual activity: Defer       Family History   Problem Relation Age of Onset   • No Known Problems Father    • No Known Problems Mother        Objective    Temp 98.2 °F (36.8 °C)   Ht 175.3 cm (69\")   Wt 122 kg (270 lb)   BMI 39.87 kg/m²     Physical Exam        Results for orders placed or performed in visit on 04/14/22   POC Urinalysis Dipstick, Multipro    Specimen: Urine   Result Value Ref Range    Color Yellow Yellow, Straw, Dark Yellow, Jennifer    Clarity, UA Clear Clear    Glucose, UA Negative Negative, 1000 mg/dL (3+) mg/dL    Bilirubin Negative Negative    Ketones, UA Negative Negative    Specific Gravity  1.020 1.005 - 1.030    Blood, UA Negative Negative    pH, Urine 6.0 5.0 - 8.0    Protein, POC Negative Negative mg/dL    Urobilinogen, UA Normal Normal    Nitrite, UA Negative Negative    Leukocytes Negative Negative     Assessment and Plan    Diagnoses and all orders for this visit:    1. Elevated PSA (Primary)  -     Cancel: POC Urinalysis Dipstick, Multipro  -     MRI Pelvis With & Without Contrast; Future    Worsening elevated PSA adjusted to 6.6 for finasteride effect with previous benign prostate biopsy for which I recommended getting a prostate MRI for further evaluation.  We discussed possible role for repeat prostate biopsy now versus MRI ultrasound fusion biopsy is indicated.  I will contact him regarding his prostate MRI results.  He was instructed on the need to follow-up with Dr. Kern regarding his A1c and glucose.  Continue finasteride, tamsulosin, and tadalafil.      This document has been " signed by BREANN Hernandez MD on October 21, 2022 17:14 CDT

## 2022-10-20 ENCOUNTER — OFFICE VISIT (OUTPATIENT)
Dept: UROLOGY | Facility: CLINIC | Age: 67
End: 2022-10-20

## 2022-10-20 VITALS — BODY MASS INDEX: 39.99 KG/M2 | TEMPERATURE: 98.2 F | WEIGHT: 270 LBS | HEIGHT: 69 IN

## 2022-10-20 DIAGNOSIS — R97.20 ELEVATED PSA: Primary | ICD-10-CM

## 2022-10-20 PROCEDURE — 99213 OFFICE O/P EST LOW 20 MIN: CPT | Performed by: UROLOGY

## 2022-11-21 ENCOUNTER — HOSPITAL ENCOUNTER (OUTPATIENT)
Dept: MRI IMAGING | Facility: HOSPITAL | Age: 67
Discharge: HOME | End: 2022-11-21
Admitting: UROLOGY

## 2022-11-21 DIAGNOSIS — R97.20 ELEVATED PSA: ICD-10-CM

## 2022-11-21 LAB — CREAT BLDA-MCNC: 1.1 MG/DL (ref 0.6–1.3)

## 2022-11-21 PROCEDURE — 72197 MRI PELVIS W/O & W/DYE: CPT

## 2022-11-21 PROCEDURE — 0 GADOBENATE DIMEGLUMINE 529 MG/ML SOLUTION: Performed by: UROLOGY

## 2022-11-21 PROCEDURE — 82565 ASSAY OF CREATININE: CPT

## 2022-11-21 PROCEDURE — A9577 INJ MULTIHANCE: HCPCS | Performed by: UROLOGY

## 2022-11-21 RX ADMIN — GADOBENATE DIMEGLUMINE 20 ML: 529 INJECTION, SOLUTION INTRAVENOUS at 15:50

## 2022-12-05 ENCOUNTER — OFFICE VISIT (OUTPATIENT)
Dept: PRIMARY CARE CLINIC | Age: 67
End: 2022-12-05
Payer: MEDICARE

## 2022-12-05 VITALS
DIASTOLIC BLOOD PRESSURE: 78 MMHG | BODY MASS INDEX: 39.1 KG/M2 | HEIGHT: 69 IN | WEIGHT: 264 LBS | TEMPERATURE: 97.7 F | SYSTOLIC BLOOD PRESSURE: 124 MMHG | HEART RATE: 81 BPM | OXYGEN SATURATION: 97 %

## 2022-12-05 DIAGNOSIS — J02.9 SORE THROAT: Primary | ICD-10-CM

## 2022-12-05 DIAGNOSIS — R52 BODY ACHES: ICD-10-CM

## 2022-12-05 LAB
INFLUENZA A ANTIBODY: NEGATIVE
INFLUENZA B ANTIBODY: NEGATIVE
S PYO AG THROAT QL: NORMAL

## 2022-12-05 PROCEDURE — 87880 STREP A ASSAY W/OPTIC: CPT | Performed by: FAMILY MEDICINE

## 2022-12-05 PROCEDURE — 87804 INFLUENZA ASSAY W/OPTIC: CPT | Performed by: FAMILY MEDICINE

## 2022-12-05 PROCEDURE — 3078F DIAST BP <80 MM HG: CPT | Performed by: FAMILY MEDICINE

## 2022-12-05 PROCEDURE — G8427 DOCREV CUR MEDS BY ELIG CLIN: HCPCS | Performed by: FAMILY MEDICINE

## 2022-12-05 PROCEDURE — 99213 OFFICE O/P EST LOW 20 MIN: CPT | Performed by: FAMILY MEDICINE

## 2022-12-05 PROCEDURE — 3074F SYST BP LT 130 MM HG: CPT | Performed by: FAMILY MEDICINE

## 2022-12-05 PROCEDURE — 1036F TOBACCO NON-USER: CPT | Performed by: FAMILY MEDICINE

## 2022-12-05 PROCEDURE — 1123F ACP DISCUSS/DSCN MKR DOCD: CPT | Performed by: FAMILY MEDICINE

## 2022-12-05 PROCEDURE — G8484 FLU IMMUNIZE NO ADMIN: HCPCS | Performed by: FAMILY MEDICINE

## 2022-12-05 PROCEDURE — 3017F COLORECTAL CA SCREEN DOC REV: CPT | Performed by: FAMILY MEDICINE

## 2022-12-05 PROCEDURE — G8417 CALC BMI ABV UP PARAM F/U: HCPCS | Performed by: FAMILY MEDICINE

## 2022-12-05 RX ORDER — FINASTERIDE 5 MG/1
TABLET, FILM COATED ORAL
COMMUNITY
Start: 2022-10-13

## 2022-12-05 SDOH — ECONOMIC STABILITY: FOOD INSECURITY: WITHIN THE PAST 12 MONTHS, YOU WORRIED THAT YOUR FOOD WOULD RUN OUT BEFORE YOU GOT MONEY TO BUY MORE.: NEVER TRUE

## 2022-12-05 SDOH — ECONOMIC STABILITY: FOOD INSECURITY: WITHIN THE PAST 12 MONTHS, THE FOOD YOU BOUGHT JUST DIDN'T LAST AND YOU DIDN'T HAVE MONEY TO GET MORE.: NEVER TRUE

## 2022-12-05 ASSESSMENT — PATIENT HEALTH QUESTIONNAIRE - PHQ9
SUM OF ALL RESPONSES TO PHQ QUESTIONS 1-9: 0
2. FEELING DOWN, DEPRESSED OR HOPELESS: 0
SUM OF ALL RESPONSES TO PHQ9 QUESTIONS 1 & 2: 0
SUM OF ALL RESPONSES TO PHQ QUESTIONS 1-9: 0
1. LITTLE INTEREST OR PLEASURE IN DOING THINGS: 0

## 2022-12-05 ASSESSMENT — ENCOUNTER SYMPTOMS
COUGH: 1
NAUSEA: 0
ABDOMINAL PAIN: 0
SORE THROAT: 1
VOMITING: 0
CHEST TIGHTNESS: 0
BLOOD IN STOOL: 0
SHORTNESS OF BREATH: 0
WHEEZING: 0

## 2022-12-05 ASSESSMENT — SOCIAL DETERMINANTS OF HEALTH (SDOH): HOW HARD IS IT FOR YOU TO PAY FOR THE VERY BASICS LIKE FOOD, HOUSING, MEDICAL CARE, AND HEATING?: NOT HARD AT ALL

## 2022-12-05 NOTE — PROGRESS NOTES
Catarino Ashley (:  1955) is a 69 y.o. male,Established patient, here for evaluation of the following chief complaint(s):  Pharyngitis (\"Real bad cold and flu symptoms\" - throat hurts so bad that it is painful to swallow. Hx strep. Mild runny nose that is beginning to go into chest.  Body aches. Denies fever. Started Friday.  )         ASSESSMENT/PLAN:  1. Sore throat  -     POCT rapid strep A  2. Body aches  -     POCT Influenza A/B    Strep and flu swab completed in office with both negative at this time. It is likely patient's symptoms are secondary to a viral pharyngitis. We will treat with benzocaine throat spray for symptomatic management. I encouraged patient this should resolve within 7 to 14 days. Many illnesses are caused by viruses. These conditions usually run their course in 7-14 days. Antibiotics do not help fight viral infections and are not needed at this time. Viral syndromes are treated with symptomatic support. You may take tylenol or ibuprofen for fever or aches and pains. Stay hydrated by taking sips of water or non caffeinated, noncarbonated, and nonalcoholic beverages throughout the day. For sore throat, you may gargle with warm salt water, use lozenges or sprays. Using a daily antihistamine such as Claritin, Zyrtec or Allegra can help with upper respiratory symptoms. Benadryl can be sedating but is helpful at drying secretions and may be taken at night. Call if you have a fever greater than 102 F or if symptoms do not improve. Your provider may also send you in prescription medications depending on your symptoms and their severity. Take all medications as directed on package unless specifically told otherwise. Return if symptoms worsen or fail to improve. Subjective   SUBJECTIVE/OBJECTIVE:  Catarino Ashley is a 69 y.o. male who presents due to ongoing sore throat and cold symptoms. Patient says his symptoms started on Friday.   Patient says he has felt very fatigued and unwell. Patient says he has tried taking lozenges, NyQuil, and DayQuil without any relief. Patient states he was afraid he may have strep because he has had it before. Patient denies any problems breathing but does note that it is painful to swallow food and liquids. Pharyngitis  This is a new problem. The current episode started in the past 7 days. The problem occurs constantly. The problem has been gradually worsening. Associated symptoms include chills, congestion, coughing, fatigue and a sore throat. Pertinent negatives include no abdominal pain, chest pain, fever, headaches, nausea, vomiting or weakness. The symptoms are aggravated by drinking and eating. He has tried NSAIDs and drinking for the symptoms. Review of Systems   Constitutional:  Positive for chills and fatigue. Negative for activity change and fever. HENT:  Positive for congestion and sore throat. Negative for ear pain. Eyes:  Negative for visual disturbance. Respiratory:  Positive for cough. Negative for chest tightness, shortness of breath and wheezing. Cardiovascular:  Negative for chest pain. Gastrointestinal:  Negative for abdominal pain, blood in stool, nausea and vomiting. Genitourinary:  Negative for difficulty urinating and urgency. Neurological:  Negative for weakness and headaches. Psychiatric/Behavioral:  Negative for confusion. Objective   Physical Exam  Vitals reviewed. Constitutional:       General: He is not in acute distress. Appearance: Normal appearance. He is ill-appearing. HENT:      Head: Normocephalic and atraumatic. Mouth/Throat:      Mouth: Mucous membranes are moist.      Pharynx: Posterior oropharyngeal erythema present. No oropharyngeal exudate. Cardiovascular:      Rate and Rhythm: Normal rate and regular rhythm. Pulses: Normal pulses. Heart sounds: Normal heart sounds. No murmur heard.   Pulmonary:      Effort: Pulmonary effort is normal. No respiratory distress. Breath sounds: Normal breath sounds. No wheezing or rhonchi. Chest:      Chest wall: No tenderness. Abdominal:      General: Abdomen is flat. Bowel sounds are normal. There is no distension. Palpations: Abdomen is soft. Tenderness: There is no abdominal tenderness. Musculoskeletal:         General: No swelling. Lymphadenopathy:      Cervical: No cervical adenopathy. Skin:     General: Skin is warm and dry. Neurological:      General: No focal deficit present. Mental Status: He is alert. Vitals:    12/05/22 1027   BP: 124/78   Pulse: 81   Temp: 97.7 °F (36.5 °C)   SpO2: 97%        Current Outpatient Medications   Medication Sig Dispense Refill    atorvastatin (LIPITOR) 40 MG tablet TAKE 1 TABLET BY MOUTH DAILY FOR HIGH CHOLESTEROL 90 tablet 1    metoprolol succinate (TOPROL XL) 100 MG extended release tablet TAKE 1 TABLET BY MOUTH DAILY AT BEDTIME FOR HEART AND HIGH BLOOD PRESSURE 90 tablet 3    indapamide (LOZOL) 1.25 MG tablet TAKE 1 TABLET BY MOUTH EVERY MORNING FOR HIGH BLOOD PRESSURE 90 tablet 3    amLODIPine-benazepril (LOTREL) 5-20 MG per capsule TAKE 1 CAPSULE BY MOUTH EVERY DAY 90 capsule 3    tamsulosin (FLOMAX) 0.4 MG capsule Take 1 capsule by mouth daily 90 capsule 3    finasteride (PROSCAR) 5 MG tablet        No current facility-administered medications for this visit.       Family History   Problem Relation Age of Onset    Heart Disease Mother     High Blood Pressure Mother     Heart Disease Father     High Blood Pressure Father       Past Medical History:   Diagnosis Date    Borderline diabetes     Cancer ([address]     skin    Chronic back pain     Hyperlipidemia     Hypertension     Obesity       Past Surgical History:   Procedure Laterality Date    ACHILLES TENDON SURGERY Left     COLONOSCOPY  12/16/2015    Dr Sylvain Kearney at [facility], normal    COLONOSCOPY N/A 04/22/2022    Dr Bri Terrell, int hem Gr 1, sub prep fair, 3 year recall    EYE SURGERY Bilateral     lens replaced    LUMBAR SPINE SURGERY Right 07/27/2021    RIGHT L3-4 METRIX MICRO DISCECTOMY performed by Eliana Morgan MD at [address]    PRE-MALIGNANT / BENIGN SKIN LESION EXCISION  2017    RETINAL DETACHMENT SURGERY Left 10/2014    RETINAL DETACHMENT SURGERY Left 07/2018    VASCULAR SURGERY  11/4/15 [facility] [address]    Aortorenal arteriogram with oblique views of the renal arteries. No Known Allergies     Lab Results   Component Value Date     (H) 10/03/2022    K 3.2 (L) 10/03/2022     10/03/2022    CO2 28 10/03/2022    BUN 30 (H) 10/03/2022    CREATININE 1.4 (H) 10/03/2022    GLUCOSE 107 10/03/2022    CALCIUM 9.5 10/03/2022    PROT 6.9 03/21/2022    LABALBU 4.5 03/21/2022    BILITOT 0.7 03/21/2022    ALKPHOS 68 03/21/2022    AST 21 03/21/2022    ALT 20 03/21/2022    LABGLOM 50 (A) 10/03/2022    GFRAA >59 10/03/2022    GLOB 2.5 03/07/2017        Lab Results   Component Value Date    WBC 6.4 07/23/2021    HGB 15.4 07/23/2021    HCT 44.2 07/23/2021    MCV 92.3 07/23/2021     07/23/2021                EMR Dragon/transcription disclaimer:  Much of this encounter note is electronic transcription/translation of spoken language toprinted texts. The electronic translation of spoken language may be erroneous, or at times, nonsensical words or phrases may be inadvertently transcribed. Although I have reviewed the note for such errors, some may stillexist.      An electronic signature was used to authenticate this note.     --Azul Armando MD

## 2022-12-18 RX ORDER — AMLODIPINE BESYLATE AND BENAZEPRIL HYDROCHLORIDE 5; 20 MG/1; MG/1
CAPSULE ORAL
Qty: 90 CAPSULE | Refills: 3 | Status: SHIPPED | OUTPATIENT
Start: 2022-12-18

## 2023-01-21 ENCOUNTER — HOSPITAL ENCOUNTER (EMERGENCY)
Age: 68
Discharge: HOME | End: 2023-01-21
Attending: EMERGENCY MEDICINE
Payer: MEDICARE

## 2023-01-21 VITALS
HEIGHT: 69 IN | DIASTOLIC BLOOD PRESSURE: 75 MMHG | OXYGEN SATURATION: 92 % | WEIGHT: 260 LBS | SYSTOLIC BLOOD PRESSURE: 125 MMHG | TEMPERATURE: 97.5 F | HEART RATE: 96 BPM | BODY MASS INDEX: 38.51 KG/M2 | RESPIRATION RATE: 16 BRPM

## 2023-01-21 DIAGNOSIS — L03.116 CELLULITIS OF LEFT LOWER EXTREMITY: Primary | ICD-10-CM

## 2023-01-21 LAB
ALBUMIN SERPL-MCNC: 3.9 G/DL (ref 3.5–5.2)
ALP BLD-CCNC: 85 U/L (ref 40–130)
ALT SERPL-CCNC: 25 U/L (ref 5–41)
ANION GAP SERPL CALCULATED.3IONS-SCNC: 10 MMOL/L (ref 7–19)
AST SERPL-CCNC: 20 U/L (ref 5–40)
BASOPHILS ABSOLUTE: 0.1 K/UL (ref 0–0.2)
BASOPHILS RELATIVE PERCENT: 0.3 % (ref 0–1)
BILIRUB SERPL-MCNC: 2.2 MG/DL (ref 0.2–1.2)
BUN BLDV-MCNC: 25 MG/DL (ref 8–23)
CALCIUM SERPL-MCNC: 8.9 MG/DL (ref 8.8–10.2)
CHLORIDE BLD-SCNC: 97 MMOL/L (ref 98–111)
CO2: 27 MMOL/L (ref 22–29)
CREAT SERPL-MCNC: 1.3 MG/DL (ref 0.5–1.2)
D DIMER: 0.67 UG/ML FEU (ref 0–0.48)
EOSINOPHILS ABSOLUTE: 0 K/UL (ref 0–0.6)
EOSINOPHILS RELATIVE PERCENT: 0.2 % (ref 0–5)
GFR SERPL CREATININE-BSD FRML MDRD: 59 ML/MIN/{1.73_M2}
GLUCOSE BLD-MCNC: 153 MG/DL (ref 74–109)
HCT VFR BLD CALC: 45.5 % (ref 42–52)
HEMOGLOBIN: 15.8 G/DL (ref 14–18)
IMMATURE GRANULOCYTES #: 0.2 K/UL
LACTIC ACID: 1.7 MMOL/L (ref 0.5–1.9)
LYMPHOCYTES ABSOLUTE: 0.8 K/UL (ref 1.1–4.5)
LYMPHOCYTES RELATIVE PERCENT: 4.1 % (ref 20–40)
MCH RBC QN AUTO: 32.4 PG (ref 27–31)
MCHC RBC AUTO-ENTMCNC: 34.7 G/DL (ref 33–37)
MCV RBC AUTO: 93.4 FL (ref 80–94)
MONOCYTES ABSOLUTE: 0.7 K/UL (ref 0–0.9)
MONOCYTES RELATIVE PERCENT: 4 % (ref 0–10)
NEUTROPHILS ABSOLUTE: 16.9 K/UL (ref 1.5–7.5)
NEUTROPHILS RELATIVE PERCENT: 90.6 % (ref 50–65)
PDW BLD-RTO: 13.3 % (ref 11.5–14.5)
PLATELET # BLD: 151 K/UL (ref 130–400)
PMV BLD AUTO: 11.1 FL (ref 9.4–12.4)
POTASSIUM SERPL-SCNC: 3.2 MMOL/L (ref 3.5–5)
RAPID INFLUENZA  B AGN: NEGATIVE
RAPID INFLUENZA A AGN: NEGATIVE
RBC # BLD: 4.87 M/UL (ref 4.7–6.1)
SARS-COV-2, NAAT: NOT DETECTED
SODIUM BLD-SCNC: 134 MMOL/L (ref 136–145)
TOTAL PROTEIN: 6.9 G/DL (ref 6.6–8.7)
WBC # BLD: 18.6 K/UL (ref 4.8–10.8)

## 2023-01-21 PROCEDURE — 96361 HYDRATE IV INFUSION ADD-ON: CPT

## 2023-01-21 PROCEDURE — 99284 EMERGENCY DEPT VISIT MOD MDM: CPT

## 2023-01-21 PROCEDURE — 96374 THER/PROPH/DIAG INJ IV PUSH: CPT

## 2023-01-21 PROCEDURE — 2580000003 HC RX 258: Performed by: EMERGENCY MEDICINE

## 2023-01-21 PROCEDURE — 36415 COLL VENOUS BLD VENIPUNCTURE: CPT

## 2023-01-21 PROCEDURE — 87635 SARS-COV-2 COVID-19 AMP PRB: CPT

## 2023-01-21 PROCEDURE — 96375 TX/PRO/DX INJ NEW DRUG ADDON: CPT

## 2023-01-21 PROCEDURE — 6360000002 HC RX W HCPCS: Performed by: EMERGENCY MEDICINE

## 2023-01-21 PROCEDURE — 85025 COMPLETE CBC W/AUTO DIFF WBC: CPT

## 2023-01-21 PROCEDURE — 87804 INFLUENZA ASSAY W/OPTIC: CPT

## 2023-01-21 PROCEDURE — 80053 COMPREHEN METABOLIC PANEL: CPT

## 2023-01-21 PROCEDURE — 87040 BLOOD CULTURE FOR BACTERIA: CPT

## 2023-01-21 PROCEDURE — 83605 ASSAY OF LACTIC ACID: CPT

## 2023-01-21 PROCEDURE — 85379 FIBRIN DEGRADATION QUANT: CPT

## 2023-01-21 RX ORDER — KETOROLAC TROMETHAMINE 30 MG/ML
15 INJECTION, SOLUTION INTRAMUSCULAR; INTRAVENOUS ONCE
Status: COMPLETED | OUTPATIENT
Start: 2023-01-21 | End: 2023-01-21

## 2023-01-21 RX ORDER — CEPHALEXIN 500 MG/1
500 CAPSULE ORAL 4 TIMES DAILY
Qty: 40 CAPSULE | Refills: 0 | Status: SHIPPED | OUTPATIENT
Start: 2023-01-21 | End: 2023-01-31

## 2023-01-21 RX ORDER — SODIUM CHLORIDE, SODIUM LACTATE, POTASSIUM CHLORIDE, AND CALCIUM CHLORIDE .6; .31; .03; .02 G/100ML; G/100ML; G/100ML; G/100ML
1000 INJECTION, SOLUTION INTRAVENOUS ONCE
Status: COMPLETED | OUTPATIENT
Start: 2023-01-21 | End: 2023-01-21

## 2023-01-21 RX ADMIN — SODIUM CHLORIDE, POTASSIUM CHLORIDE, SODIUM LACTATE AND CALCIUM CHLORIDE 1000 ML: 600; 310; 30; 20 INJECTION, SOLUTION INTRAVENOUS at 09:51

## 2023-01-21 RX ADMIN — CEFTRIAXONE 1000 MG: 1 INJECTION, POWDER, FOR SOLUTION INTRAMUSCULAR; INTRAVENOUS at 09:47

## 2023-01-21 RX ADMIN — KETOROLAC TROMETHAMINE 15 MG: 30 INJECTION, SOLUTION INTRAMUSCULAR; INTRAVENOUS at 09:46

## 2023-01-21 ASSESSMENT — PAIN SCALES - GENERAL: PAINLEVEL_OUTOF10: 7

## 2023-01-21 ASSESSMENT — PAIN DESCRIPTION - LOCATION: LOCATION: LEG

## 2023-01-21 ASSESSMENT — ENCOUNTER SYMPTOMS
RESPIRATORY NEGATIVE: 1
GASTROINTESTINAL NEGATIVE: 1
EYES NEGATIVE: 1

## 2023-01-21 ASSESSMENT — PAIN DESCRIPTION - DESCRIPTORS: DESCRIPTORS: SHOOTING

## 2023-01-21 ASSESSMENT — PAIN - FUNCTIONAL ASSESSMENT: PAIN_FUNCTIONAL_ASSESSMENT: 0-10

## 2023-01-21 ASSESSMENT — PAIN DESCRIPTION - ORIENTATION: ORIENTATION: LEFT

## 2023-01-21 NOTE — ED PROVIDER NOTES
[facility] EMERGENCY DEPT  EMERGENCY DEPARTMENT ENCOUNTER      Pt Name: Janet Saint  MRN: [id number]  [place] 1955  Date of evaluation: 1/21/2023  Provider: Maria Eugenia Whittaker MD    [address] [place] [address]       Chief Complaint   Patient presents with    Leg Pain     LLE, redness and pain         HISTORY OF PRESENT ILLNESS   (Location/Symptom, Timing/Onset,Context/Setting, Quality, Duration, Modifying Factors, Severity)  Note limiting factors. Janet Saint is a 69 y.o. male who presents to the emergency department with complaint of pain, swelling, and redness to the left leg. Started having discomfort in the leg several days ago. Over the last 2 days has developed redness, swelling, and worsening pain. Says a few days prior to the development of this, he did hit his knee on a trailer hitch while he was wearing a knee brace and realized later that evening that there was a small scrape or cut just below the knee on the anterior left leg. The redness and seem to have necessarily developed from that area but distal to it. Has had additional symptoms including headache, generalized malaise and fatigue, and chills over the last several days. HPI    NursingNotes were reviewed. REVIEW OF SYSTEMS    (2-9 systems for level 4, 10 or more for level 5)     Review of Systems   Constitutional:  Positive for chills and fatigue. HENT: Negative. Eyes: Negative. Respiratory: Negative. Cardiovascular:  Positive for leg swelling. Gastrointestinal: Negative. Genitourinary: Negative. Musculoskeletal: Negative. Neurological:  Positive for headaches. Hematological: Negative. Psychiatric/Behavioral: Negative. A complete review of systems was performed and is negative except as noted above in the HPI.        PAST MEDICAL HISTORY     Past Medical History:   Diagnosis Date    Borderline diabetes     Cancer ([facility] [address]     skin    Chronic back pain     Hyperlipidemia     Hypertension     Obesity SURGICAL HISTORY       Past Surgical History:   Procedure Laterality Date    ACHILLES TENDON SURGERY Left     COLONOSCOPY  12/16/2015    Dr Angela Carr at [facility], normal    COLONOSCOPY N/A 04/22/2022    Dr Juan Pablo Baca, int hem Gr 1, sub prep fair, 3 year recall    EYE SURGERY Bilateral     lens replaced    LUMBAR SPINE SURGERY Right 07/27/2021    RIGHT L3-4 METRIX MICRO DISCECTOMY performed by Rudy Watt MD at [address]    PRE-MALIGNANT / BENIGN SKIN LESION EXCISION  2017    RETINAL DETACHMENT SURGERY Left 10/2014    RETINAL DETACHMENT SURGERY Left 07/2018    VASCULAR SURGERY  11/4/15 [facility]    Aortorenal arteriogram with oblique views of the renal arteries. CURRENT MEDICATIONS       Discharge Medication List as of 1/21/2023 10:39 AM        CONTINUE these medications which have NOT CHANGED    Details   amLODIPine-benazepril (LOTREL) 5-20 MG per capsule TAKE 1 CAPSULE BY MOUTH EVERY DAY, Disp-90 capsule, R-3Normal      finasteride (PROSCAR) 5 MG tablet Historical Med      Benzocaine-Glycerin-DM 5-30-5 %-MG/SPRAY LIQD Take 1 spray by mouth 4 times daily as needed (sore throat), Disp-20 mL, R-1Normal      atorvastatin (LIPITOR) 40 MG tablet TAKE 1 TABLET BY MOUTH DAILY FOR HIGH CHOLESTEROL, Disp-90 tablet, R-1Normal      metoprolol succinate (TOPROL XL) 100 MG extended release tablet TAKE 1 TABLET BY MOUTH DAILY AT BEDTIME FOR HEART AND HIGH BLOOD PRESSURE, Disp-90 tablet, R-3Normal      indapamide (LOZOL) 1.25 MG tablet TAKE 1 TABLET BY MOUTH EVERY MORNING FOR HIGH BLOOD PRESSURE, Disp-90 tablet, R-3Normal      tamsulosin (FLOMAX) 0.4 MG capsule Take 1 capsule by mouth daily, Disp-90 capsule, R-3Normal             ALLERGIES     Patient has no known allergies.     FAMILY HISTORY       Family History   Problem Relation Age of Onset    Heart Disease Mother     High Blood Pressure Mother     Heart Disease Father     High Blood Pressure Father           SOCIAL HISTORY       Social History     Socioeconomic History    Marital status:      Spouse name: None    Number of children: 1    Years of education: None    Highest education level: None   Occupational History    Occupation: retired   Tobacco Use    Smoking status: Never    Smokeless tobacco: Never   Vaping Use    Vaping Use: Never used   Substance and Sexual Activity    Alcohol use: No    Drug use: No    Sexual activity: Yes     Partners: Female     Social Determinants of Health     Financial Resource Strain: Low Risk     Difficulty of Paying Living Expenses: Not hard at all   Food Insecurity: No Food Insecurity    Worried About [address] in the Last Year: Never true    [facility] in the Last Year: Never true   Physical Activity: Insufficiently Active    Days of Exercise per Week: 4 days    Minutes of Exercise per Session: 30 min       SCREENINGS    Shawn Coma Scale  Eye Opening: Spontaneous  Best Verbal Response: Oriented  Best Motor Response: Obeys commands  [place] Coma Scale Score: 15        PHYSICAL EXAM    (up to 7 for level 4, 8 or more for level 5)     ED Triage Vitals [01/21/23 0836]   BP Temp Temp Source Heart Rate Resp SpO2 Height Weight   (!) 135/96 97.5 °F (36.4 °C) Oral (!) 116 17 96 % 5' 9\" (1.753 m) 260 lb (117.9 kg)       Physical Exam  Vitals and nursing note reviewed. Constitutional:       General: He is not in acute distress. Appearance: He is well-developed. He is not toxic-appearing or diaphoretic. HENT:      Head: Normocephalic and atraumatic. Mouth/Throat:      Mouth: Mucous membranes are moist.      Pharynx: Oropharynx is clear. Eyes:      General: No scleral icterus. Right eye: No discharge. Left eye: No discharge. Pupils: Pupils are equal, round, and reactive to light. Cardiovascular:      Rate and Rhythm: Regular rhythm. Tachycardia present. Pulmonary:      Effort: Pulmonary effort is normal. No respiratory distress. Breath sounds: No stridor.    Abdominal:      General: There is no distension. Musculoskeletal:         General: No deformity. Normal range of motion. Cervical back: Normal range of motion. Comments: Warmth, redness, swelling on the anterior distal left lower extremity with streaking up the medial aspect extending up to the medial left thigh. Small superficial abrasion to the anterior tibia several centimeters below the left knee   Skin:     General: Skin is warm and dry. Neurological:      General: No focal deficit present. Mental Status: He is alert and oriented to person, place, and time. GCS: GCS eye subscore is 4. GCS verbal subscore is 5. GCS motor subscore is 6. Cranial Nerves: No cranial nerve deficit. Motor: No abnormal muscle tone. Psychiatric:         Behavior: Behavior normal.         Thought Content:  Thought content normal.         Judgment: Judgment normal.       DIAGNOSTIC RESULTS     EKG: All EKG's are interpreted by the Emergency Department Physician who either signs or Co-signs this chart in the absence of a cardiologist.        RADIOLOGY:   Non-plain film images such as CT, Ultrasound and MRI are read by the radiologist. Zay Elzbieta images are visualized and preliminarily interpreted by the emergency physician with the below findings:        Interpretation per the Radiologist below, if available at the time of this note:    No orders to display         ED BEDSIDE ULTRASOUND:   Performed by ED Physician - none    LABS:  Labs Reviewed   CBC WITH AUTO DIFFERENTIAL - Abnormal; Notable for the following components:       Result Value    WBC 18.6 (*)     MCH 32.4 (*)     Neutrophils % 90.6 (*)     Lymphocytes % 4.1 (*)     Neutrophils Absolute 16.9 (*)     Lymphocytes Absolute 0.8 (*)     All other components within normal limits   COMPREHENSIVE METABOLIC PANEL - Abnormal; Notable for the following components:    Sodium 134 (*)     Potassium 3.2 (*)     Chloride 97 (*)     Glucose 153 (*)     BUN 25 (*) Creatinine 1.3 (*)     Est, Glom Filt Rate 59 (*)     Total Bilirubin 2.2 (*)     All other components within normal limits   D-DIMER, QUANTITATIVE - Abnormal; Notable for the following components:    D-Dimer, Quant 0.67 (*)     All other components within normal limits   COVID-19, RAPID   RAPID INFLUENZA A/B ANTIGENS   CULTURE, BLOOD 2   CULTURE, BLOOD 1   LACTIC ACID       All other labs were within normal range or not returned as of this dictation. Medications   lactated ringers bolus (0 mLs IntraVENous Stopped 1/21/23 1039)   cefTRIAXone (ROCEPHIN) 1,000 mg in sterile water 10 mL IV syringe (1,000 mg IntraVENous Given 1/21/23 0947)   ketorolac (TORADOL) injection 15 mg (15 mg IntraVENous Given 1/21/23 0946)       EMERGENCY DEPARTMENT COURSE and DIFFERENTIALDIAGNOSIS/MDM:   Vitals:    Vitals:    01/21/23 0836 01/21/23 0900 01/21/23 1030   BP: (!) 135/96 127/84 125/75   Pulse: (!) 116  96   Resp: 17  16   Temp: 97.5 °F (36.4 °C)     TempSrc: Oral     SpO2: 96% 93% 92%   Weight: 260 lb (117.9 kg)     Height: 5' 9\" (1.753 m)         MDM      ED Course as of 01/21/23 1650   Sat Jan 21, 2023   0933 D-Dimer, Quant(!): 0.67 [MANAS]   0933 WBC(!): 18.6 [MANAS]   1004 Evaluation with the streaking progressing up to the leg, warmth to touch, redness, and subjective fevers, suspect cellulitis. White blood cell count significantly elevated which also supports infectious etiology. Age-adjusted D-dimer is negative. Discussed further management options with patient. Meets SIRS criteria with tachycardia and elevated white blood cell count. Patient would like to try and treat this at home with return precautions if symptoms worsen or have not improved after treatment [MANAS]      ED Course User Index  [MANAS] Shelley Rutledge MD     Offered admission for further management fluids. Patient would like to try oral antibiotics for treatment with IV antibiotics here.   Discussed return precautions for worsening and advised him on the concerning findings of the tachycardia and elevated white blood cell count that could be signs of more serious infection. Blood cultures sent to be followed up. Evaluation and work-up here revealed no signs of emergent or life-threatening pathology that would necessitate admission for further work-up or management at this time. Patient is felt to be stable for discharge home with return precautions for worsening of the condition or development of new concerning symptoms. Patient was encouraged to follow-up with their primary care doctor in the appropriate timeframe. Necessary prescriptions and information have been provided for treatment at home. Patient voices understanding and agreement with the plan. CONSULTS:  None    PROCEDURES:  Unless otherwise notedbelow, none     Procedures      FINAL IMPRESSION     1. Cellulitis of left lower extremity          DISPOSITION/PLAN   DISPOSITION Decision To Discharge 01/21/2023 10:32:41 AM      No notes of EC Admission Criteria type on file.     PATIENT REFERRED TO:  [address] [place] [address] EMERGENCY DEPT  [facility]  [phone]    If symptoms worsen    Maggie Joyner MD  [address] [id number] [phone]      As needed      DISCHARGE MEDICATIONS:  Discharge Medication List as of 1/21/2023 10:39 AM        START taking these medications    Details   cephALEXin (KEFLEX) 500 MG capsule Take 1 capsule by mouth 4 times daily for 10 days, Disp-40 capsule, R-0Normal                (Please note that portions of this note were completed with a voice recognition program.  Efforts were made to edit the dictations butoccasionally words are mis-transcribed.)    Jens Back MD (electronically signed)  AttendingEmerWhite County Medical Centercy Physician         Jens Back., MD  01/21/23 [id number]

## 2023-01-22 LAB
BLOOD CULTURE, ROUTINE: NORMAL
CULTURE, BLOOD 2: NORMAL

## 2023-01-26 LAB
BLOOD CULTURE, ROUTINE: NORMAL
CULTURE, BLOOD 2: NORMAL

## 2023-01-27 RX ORDER — INDAPAMIDE 1.25 MG/1
1.25 TABLET, FILM COATED ORAL EVERY MORNING
Qty: 90 TABLET | Refills: 3 | Status: SHIPPED | OUTPATIENT
Start: 2023-01-27

## 2023-03-20 RX ORDER — ATORVASTATIN CALCIUM 40 MG/1
40 TABLET, FILM COATED ORAL DAILY
Qty: 90 TABLET | Refills: 1 | Status: SHIPPED | OUTPATIENT
Start: 2023-03-20

## 2023-04-16 DIAGNOSIS — R35.0 URINE FREQUENCY: Primary | ICD-10-CM

## 2023-04-17 RX ORDER — TAMSULOSIN HYDROCHLORIDE 0.4 MG/1
CAPSULE ORAL
Qty: 90 CAPSULE | Refills: 3 | Status: SHIPPED | OUTPATIENT
Start: 2023-04-17

## 2023-05-01 RX ORDER — METOPROLOL SUCCINATE 100 MG/1
100 TABLET, EXTENDED RELEASE ORAL DAILY
Qty: 90 TABLET | Refills: 3 | Status: SHIPPED | OUTPATIENT
Start: 2023-05-01

## 2023-05-15 NOTE — PROGRESS NOTES
Subjective    Mr. Melo is 68 y.o. male    Chief Complaint: Elevated PSA    History of Present Illness    68-year-old male established patient follow-up for enlarged prostate, and ED.  He had benign prostate biopsy April 2021 for PSA of 4.3.  His TRUS volume at that time was 48 cc.  He ran out of his finasteride and stopped taking.  PSA this month is stable at 3.2.   He also takes tamsulosin as well as tadalafil for ED.  No hematuria or dysuria.      Lab Results   Component Value Date    PSA 3.2 05/23/2023    PSA 3.29 10/03/2022    PSA 5.15 (H) 03/21/2022       The following portions of the patient's history were reviewed and updated as appropriate: allergies, current medications, past family history, past medical history, past social history, past surgical history and problem list.    Review of Systems      Current Outpatient Medications:     amLODIPine-benazepril (LOTREL 5-20) 5-20 MG per capsule, Take 1 capsule by mouth., Disp: , Rfl:     atorvastatin (LIPITOR) 40 MG tablet, TAKE 1 TABLET BY MOUTH DAILY FOR HIGH CHOLESTEROL, Disp: , Rfl:     indapamide (LOZOL) 1.25 MG tablet, Take 1 tablet by mouth Every Morning., Disp: , Rfl:     metFORMIN (GLUCOPHAGE) 500 MG tablet, Take 2 tablets by mouth Every 12 (Twelve) Hours., Disp: , Rfl:     metoprolol succinate XL (TOPROL-XL) 50 MG 24 hr tablet, Take 1 tablet by mouth Daily., Disp: , Rfl:     tadalafil (Cialis) 20 MG tablet, 1/2 tab by mouth 1 hour prior to intercourse, Disp: 10 tablet, Rfl: 11    tamsulosin (FLOMAX) 0.4 MG capsule 24 hr capsule, TAKE 1 CAPSULE BY MOUTH EVERY DAY AT NIGHT, Disp: 90 capsule, Rfl: 3    Past Medical History:   Diagnosis Date    High cholesterol     Hypertension        History reviewed. No pertinent surgical history.    Social History     Socioeconomic History    Marital status: Unknown   Tobacco Use    Smoking status: Never    Smokeless tobacco: Never   Vaping Use    Vaping Use: Never used   Substance and Sexual Activity    Alcohol use:  "Never    Drug use: Never    Sexual activity: Defer       Family History   Problem Relation Age of Onset    No Known Problems Father     No Known Problems Mother        Objective    Temp 98.2 °F (36.8 °C)   Ht 175.3 cm (69\")   Wt 122 kg (270 lb)   BMI 39.87 kg/m²     Physical Exam        Results for orders placed or performed in visit on 05/23/23   PSA DIAGNOSTIC   Result Value Ref Range    PSA 3.2 0.0 - 4.0 ng/mL     Assessment and Plan    Diagnoses and all orders for this visit:    1. Elevated PSA (Primary)  -     Cancel: POC Urinalysis Dipstick, Multipro    2. Erectile dysfunction due to arterial insufficiency  -     tadalafil (Cialis) 20 MG tablet; 1/2 tab by mouth 1 hour prior to intercourse  Dispense: 10 tablet; Refill: 11      Stable PSA normal for his age.  He would like to stay off the finasteride.  Continue tamsulosin.  Continue tadalafil for ED.  He will follow-up with me in the [facility] in 1 year or sooner as needed.  I recommended no further PSA testing given his PSA stability and age per AUA guidelines.      This document has been signed by BREANN Hernandez MD on May 26, 2023 16:29 CDT              "

## 2023-05-16 DIAGNOSIS — R97.20 ELEVATED PSA: Primary | ICD-10-CM

## 2023-05-18 ENCOUNTER — TELEPHONE (OUTPATIENT)
Dept: UROLOGY | Facility: CLINIC | Age: 68
End: 2023-05-18
Payer: MEDICARE

## 2023-05-18 NOTE — TELEPHONE ENCOUNTER
Patient called verifying his PSA order for his upcoming appt. I gave him Stanley's office number for him to call and set up an appt to get the lab done

## 2023-05-23 ENCOUNTER — LAB (OUTPATIENT)
Dept: INTERNAL MEDICINE | Facility: CLINIC | Age: 68
End: 2023-05-23
Payer: MEDICARE

## 2023-05-24 LAB — PSA SERPL-MCNC: 3.2 NG/ML (ref 0–4)

## 2023-05-25 ENCOUNTER — OFFICE VISIT (OUTPATIENT)
Dept: UROLOGY | Facility: CLINIC | Age: 68
End: 2023-05-25
Payer: MEDICARE

## 2023-05-25 VITALS — TEMPERATURE: 98.2 F | BODY MASS INDEX: 39.99 KG/M2 | HEIGHT: 69 IN | WEIGHT: 270 LBS

## 2023-05-25 DIAGNOSIS — N52.01 ERECTILE DYSFUNCTION DUE TO ARTERIAL INSUFFICIENCY: Primary | ICD-10-CM

## 2023-05-25 DIAGNOSIS — R97.20 ELEVATED PSA: ICD-10-CM

## 2023-05-25 PROCEDURE — 1160F RVW MEDS BY RX/DR IN RCRD: CPT | Performed by: UROLOGY

## 2023-05-25 PROCEDURE — 99214 OFFICE O/P EST MOD 30 MIN: CPT | Performed by: UROLOGY

## 2023-05-25 PROCEDURE — 1159F MED LIST DOCD IN RCRD: CPT | Performed by: UROLOGY

## 2023-05-25 RX ORDER — INDAPAMIDE 1.25 MG/1
1 TABLET, FILM COATED ORAL EVERY MORNING
COMMUNITY
Start: 2023-01-27

## 2023-05-25 RX ORDER — TADALAFIL 20 MG/1
TABLET ORAL
Qty: 10 TABLET | Refills: 11 | Status: SHIPPED | OUTPATIENT
Start: 2023-05-25

## 2023-09-11 RX ORDER — ATORVASTATIN CALCIUM 40 MG/1
40 TABLET, FILM COATED ORAL DAILY
Qty: 90 TABLET | Refills: 1 | Status: SHIPPED | OUTPATIENT
Start: 2023-09-11

## 2023-12-28 RX ORDER — AMLODIPINE BESYLATE AND BENAZEPRIL HYDROCHLORIDE 5; 20 MG/1; MG/1
CAPSULE ORAL
Qty: 90 CAPSULE | Refills: 3 | Status: SHIPPED | OUTPATIENT
Start: 2023-12-28

## 2024-03-21 NOTE — PROGRESS NOTES
"Subjective    Mr. Melo is 69 y.o. male    Chief Complaint: Elevated PSA    History of Present Illness    69-year-old male established patient follow-up for enlarged prostate.  he recently increased his tamsulosin dose to 0.8 mg nightly from Dr. Banda and his LUTS are currently well-controlled on this monotherapy.  He had benign prostate biopsy April 2021 for PSA of 4.3.  His TRUS volume at that time was 48 cc.  He is no longer requiring the tadalafil for ED.  No hematuria or dysuria.   We stopped PSA screening last year given his PSA stability and benign biopsy    The following portions of the patient's history were reviewed and updated as appropriate: allergies, current medications, past family history, past medical history, past social history, past surgical history and problem list.    Review of Systems      Current Outpatient Medications:     amLODIPine-benazepril (LOTREL 5-20) 5-20 MG per capsule, Take 1 capsule by mouth., Disp: , Rfl:     atorvastatin (LIPITOR) 40 MG tablet, TAKE 1 TABLET BY MOUTH DAILY FOR HIGH CHOLESTEROL, Disp: , Rfl:     tamsulosin (FLOMAX) 0.4 MG capsule 24 hr capsule, TAKE 1 CAPSULE BY MOUTH EVERY DAY AT NIGHT, Disp: 90 capsule, Rfl: 3    Past Medical History:   Diagnosis Date    High cholesterol     Hypertension        History reviewed. No pertinent surgical history.    Social History     Socioeconomic History    Marital status: Unknown   Tobacco Use    Smoking status: Never    Smokeless tobacco: Never   Vaping Use    Vaping status: Never Used   Substance and Sexual Activity    Alcohol use: Never    Drug use: Never    Sexual activity: Defer       Family History   Problem Relation Age of Onset    No Known Problems Father     No Known Problems Mother        Objective    Temp 98.2 °F (36.8 °C)   Ht 175.3 cm (69\")   Wt 93 kg (205 lb)   BMI 30.27 kg/m²     Physical Exam        Results for orders placed or performed in visit on 05/23/23   PSA DIAGNOSTIC   Result Value Ref Range    PSA 3.2 " 0.0 - 4.0 ng/mL     Assessment and Plan    Diagnoses and all orders for this visit:    1. Elevated PSA (Primary)  -     Cancel: POC Urinalysis Dipstick, Multipro    2. Urine frequency        LUTS well-controlled on tamsulosin-continue.  I again recommended no further PSA screening.  He will follow-up with me in 1 year in my [facility] or sooner as needed.      This document has been signed by BREANN Hernandez MD on April 11, 2024 14:45 CDT

## 2024-04-04 ENCOUNTER — TELEPHONE (OUTPATIENT)
Dept: UROLOGY | Facility: CLINIC | Age: 69
End: 2024-04-04
Payer: MEDICARE

## 2024-04-04 NOTE — TELEPHONE ENCOUNTER
Patient called with questions in regards to needing a PSA for his next visit. Per Dr. Hernandez' last note:  I recommended no further PSA testing given his PSA stability and age per AUA guidelines.     Pt v/u

## 2024-04-11 ENCOUNTER — OFFICE VISIT (OUTPATIENT)
Dept: UROLOGY | Facility: CLINIC | Age: 69
End: 2024-04-11
Payer: MEDICARE

## 2024-04-11 VITALS — TEMPERATURE: 98.2 F | WEIGHT: 205 LBS | BODY MASS INDEX: 30.36 KG/M2 | HEIGHT: 69 IN

## 2024-04-11 DIAGNOSIS — R35.0 URINE FREQUENCY: ICD-10-CM

## 2024-04-11 DIAGNOSIS — R97.20 ELEVATED PSA: Primary | ICD-10-CM

## 2024-04-11 PROCEDURE — 1160F RVW MEDS BY RX/DR IN RCRD: CPT | Performed by: UROLOGY

## 2024-04-11 PROCEDURE — 1159F MED LIST DOCD IN RCRD: CPT | Performed by: UROLOGY

## 2024-04-11 PROCEDURE — 99213 OFFICE O/P EST LOW 20 MIN: CPT | Performed by: UROLOGY

## 2025-04-14 NOTE — PROGRESS NOTES
Subjective    Mr. Melo is 70 y.o. male    Chief Complaint: Elevated PSA    History of Present Illness    70-year-old male established patient follow-up for enlarged prostate.  He has been taking the tamsulosin  0.8 mg nightly from Dr. Banda and his LUTS remain moderately bothersome but not lifestyle limiting.  Mostly bothered by urgency and frequently voiding small amounts.  He was previously given finasteride in April 2022 but he ran out and stopped taking it sometime in 2023.   He had benign prostate biopsy April 2021 for PSA of 4.3.  His TRUS volume at that time was 48 cc.  He previously used tadalafil for ED but is no longer requiring this medication.    We stopped PSA screening given his PSA stability and benign biopsy.  He has been talking to a friend who had prostate Aquablaion and had questions about this procedure.    The following portions of the patient's history were reviewed and updated as appropriate: allergies, current medications, past family history, past medical history, past social history, past surgical history and problem list.    Review of Systems      Current Outpatient Medications:     amLODIPine-benazepril (LOTREL 5-20) 5-20 MG per capsule, Take 1 capsule by mouth., Disp: , Rfl:     atorvastatin (LIPITOR) 40 MG tablet, TAKE 1 TABLET BY MOUTH DAILY FOR HIGH CHOLESTEROL, Disp: , Rfl:     tamsulosin (FLOMAX) 0.4 MG capsule 24 hr capsule, Take 1 capsule by mouth Every Night., Disp: 180 capsule, Rfl: 3    finasteride (PROSCAR) 5 MG tablet, Take 1 tablet by mouth Daily., Disp: 90 tablet, Rfl: 3    Past Medical History:   Diagnosis Date    High cholesterol     Hypertension        History reviewed. No pertinent surgical history.    Social History     Socioeconomic History    Marital status: Unknown   Tobacco Use    Smoking status: Never     Passive exposure: Never    Smokeless tobacco: Never   Vaping Use    Vaping status: Never Used   Substance and Sexual Activity    Alcohol use: Never    Drug use:  "Never    Sexual activity: Defer       Family History   Problem Relation Age of Onset    No Known Problems Father     No Known Problems Mother        Objective    Temp 98.2 °F (36.8 °C)   Ht 175.3 cm (69\")   Wt 91.6 kg (202 lb)   BMI 29.83 kg/m²     Physical Exam        Results for orders placed or performed in visit on 05/23/23   PSA DIAGNOSTIC    Collection Time: 05/23/23 10:42 AM   Result Value Ref Range    PSA 3.2 0.0 - 4.0 ng/mL     Assessment and Plan    Diagnoses and all orders for this visit:    1. Benign prostatic hyperplasia with urinary obstruction (Primary)  -     finasteride (PROSCAR) 5 MG tablet; Take 1 tablet by mouth Daily.  Dispense: 90 tablet; Refill: 3    2. Elevated PSA    3. Urine frequency  -     Discontinue: tamsulosin (FLOMAX) 0.4 MG capsule 24 hr capsule; Take 1 capsule by mouth Every Night.  Dispense: 90 capsule; Refill: 3  -     tamsulosin (FLOMAX) 0.4 MG capsule 24 hr capsule; Take 1 capsule by mouth Every Night.  Dispense: 180 capsule; Refill: 3        He states his LUTS are not yet lifestyle limiting where he would want surgery.  I recommended continuing the tamsulosin for now.  He was instructed to call back as needed to schedule cystoscopy with flow rate and transrectal ultrasound should he wish to consider aqua ablation.  I  He will follow-up with urology [facility] in [place] in 6 months       This document has been signed by BREANN Hernandez MD on April 17, 2025 14:07 CDT              "

## 2025-04-17 ENCOUNTER — OFFICE VISIT (OUTPATIENT)
Dept: UROLOGY | Facility: CLINIC | Age: 70
End: 2025-04-17
Payer: MEDICARE

## 2025-04-17 VITALS — HEIGHT: 69 IN | TEMPERATURE: 98.2 F | WEIGHT: 202 LBS | BODY MASS INDEX: 29.92 KG/M2

## 2025-04-17 DIAGNOSIS — R35.0 URINE FREQUENCY: ICD-10-CM

## 2025-04-17 DIAGNOSIS — N13.8 BENIGN PROSTATIC HYPERPLASIA WITH URINARY OBSTRUCTION: Primary | ICD-10-CM

## 2025-04-17 DIAGNOSIS — R97.20 ELEVATED PSA: ICD-10-CM

## 2025-04-17 DIAGNOSIS — N40.1 BENIGN PROSTATIC HYPERPLASIA WITH URINARY OBSTRUCTION: Primary | ICD-10-CM

## 2025-04-17 PROCEDURE — 1159F MED LIST DOCD IN RCRD: CPT | Performed by: UROLOGY

## 2025-04-17 PROCEDURE — 1160F RVW MEDS BY RX/DR IN RCRD: CPT | Performed by: UROLOGY

## 2025-04-17 PROCEDURE — 99213 OFFICE O/P EST LOW 20 MIN: CPT | Performed by: UROLOGY

## 2025-04-17 RX ORDER — FINASTERIDE 5 MG/1
5 TABLET, FILM COATED ORAL DAILY
Qty: 90 TABLET | Refills: 3 | Status: SHIPPED | OUTPATIENT
Start: 2025-04-17

## 2025-04-17 RX ORDER — TAMSULOSIN HYDROCHLORIDE 0.4 MG/1
1 CAPSULE ORAL NIGHTLY
Qty: 180 CAPSULE | Refills: 3 | Status: SHIPPED | OUTPATIENT
Start: 2025-04-17

## 2025-04-17 RX ORDER — TAMSULOSIN HYDROCHLORIDE 0.4 MG/1
1 CAPSULE ORAL NIGHTLY
Qty: 90 CAPSULE | Refills: 3 | Status: SHIPPED | OUTPATIENT
Start: 2025-04-17 | End: 2025-04-17

## (undated) DEVICE — NEURO CDS

## (undated) DEVICE — TOOL 14MH30 LEGEND 14CM 3MM: Brand: MIDAS REX ™

## (undated) DEVICE — ENDO KIT,LOURDES HOSPITAL: Brand: MEDLINE INDUSTRIES, INC.

## (undated) DEVICE — TIBURON LAPAROTOMY DRAPE: Brand: CONVERTORS

## (undated) DEVICE — HYPODERMIC SAFETY NEEDLE: Brand: MAGELLAN

## (undated) DEVICE — STRIP,CLOSURE,WOUND,MEDI-STRIP,1/2X4: Brand: MEDLINE

## (undated) DEVICE — MASTISOL ADHESIVE LIQ 2/3ML

## (undated) DEVICE — TABLE KIT CVR AXIS JACKSON

## (undated) DEVICE — ADHESIVE SKIN CLSR 0.7ML TOP DERMBND ADV

## (undated) DEVICE — FORCEP BPLR IRIS

## (undated) DEVICE — Z DUPLICATE USE 2738952 SYSTEM VENT M AD NSL PAP DEV HD STRP 2L HYPRINFL BG MRI

## (undated) DEVICE — SUTURE VCRL SZ 3-0 L18IN ABSRB VLT CT-1 L36MM 1/2 CIR J738D

## (undated) DEVICE — GLOVE SURG SZ 8 L12IN FNGR THK94MIL TRNSLUC YEL LTX HYDRGEL

## (undated) DEVICE — E-Z CLEAN, NON-STICK, PTFE COATED, ELECTROSURGICAL BLADE ELECTRODE, MODIFIED EXTENDED INSULATION, 2.5 INCH (6.35 CM): Brand: MEGADYNE

## (undated) DEVICE — BAG BND W36XL36IN TRNSPAR POLY GEN PURP W E BND CLSR TIDI

## (undated) DEVICE — UNDERGLOVE SURG SZ 8 FNGR THK0.21MIL GRN LTX BEAD CUF

## (undated) DEVICE — AGENT HEMSTAT 8ML FLX TIP MTRX + DISP SURGIFLO

## (undated) DEVICE — SOLUTION IV IRRIG POUR BRL 0.9% SODIUM CHL 2F7124

## (undated) DEVICE — SUTURE VCRL SZ 2-0 L18IN ABSRB UD CT-1 L36MM 1/2 CIR J839D